# Patient Record
Sex: MALE | Race: BLACK OR AFRICAN AMERICAN | Employment: OTHER | URBAN - METROPOLITAN AREA
[De-identification: names, ages, dates, MRNs, and addresses within clinical notes are randomized per-mention and may not be internally consistent; named-entity substitution may affect disease eponyms.]

---

## 2017-01-04 ENCOUNTER — APPOINTMENT (OUTPATIENT)
Dept: INFUSION THERAPY | Age: 72
End: 2017-01-04
Payer: MEDICARE

## 2017-01-09 ENCOUNTER — APPOINTMENT (OUTPATIENT)
Dept: INFUSION THERAPY | Age: 72
End: 2017-01-09
Payer: MEDICARE

## 2017-01-10 ENCOUNTER — HOSPITAL ENCOUNTER (OUTPATIENT)
Dept: INFUSION THERAPY | Age: 72
Discharge: HOME | End: 2017-01-10
Payer: MEDICARE

## 2017-01-10 VITALS
HEART RATE: 68 BPM | RESPIRATION RATE: 18 BRPM | SYSTOLIC BLOOD PRESSURE: 164 MMHG | OXYGEN SATURATION: 98 % | TEMPERATURE: 97.4 F | DIASTOLIC BLOOD PRESSURE: 84 MMHG

## 2017-01-10 LAB
BASOPHILS # BLD AUTO: 0.1 K/UL (ref 0–0.06)
BASOPHILS # BLD: 1 % (ref 0–3)
DIFFERENTIAL METHOD BLD: ABNORMAL
EOSINOPHIL # BLD: 0.4 K/UL (ref 0–0.4)
EOSINOPHIL NFR BLD: 5 % (ref 0–5)
ERYTHROCYTE [DISTWIDTH] IN BLOOD BY AUTOMATED COUNT: 16.4 % (ref 11.6–14.5)
HCT VFR BLD AUTO: 23.4 % (ref 36–48)
HGB BLD-MCNC: 7.7 G/DL (ref 13–16)
LYMPHOCYTES # BLD AUTO: 18 % (ref 20–51)
LYMPHOCYTES # BLD: 1.3 K/UL (ref 0.8–3.5)
MCH RBC QN AUTO: 25 PG (ref 24–34)
MCHC RBC AUTO-ENTMCNC: 32.9 G/DL (ref 31–37)
MCV RBC AUTO: 76 FL (ref 74–97)
MONOCYTES # BLD: 1.1 K/UL (ref 0–1)
MONOCYTES NFR BLD AUTO: 15 % (ref 2–9)
NEUTS SEG # BLD: 4.5 K/UL (ref 1.8–8)
NEUTS SEG NFR BLD AUTO: 61 % (ref 42–75)
PLATELET # BLD AUTO: 183 K/UL (ref 135–420)
PLATELET COMMENTS,PCOM: ABNORMAL
RBC # BLD AUTO: 3.08 M/UL (ref 4.7–5.5)
RBC MORPH BLD: ABNORMAL
RBC MORPH BLD: ABNORMAL
WBC # BLD AUTO: 7.4 K/UL (ref 4.6–13.2)

## 2017-01-10 PROCEDURE — 96372 THER/PROPH/DIAG INJ SC/IM: CPT

## 2017-01-10 PROCEDURE — 36415 COLL VENOUS BLD VENIPUNCTURE: CPT

## 2017-01-10 PROCEDURE — 85025 COMPLETE CBC W/AUTO DIFF WBC: CPT | Performed by: INTERNAL MEDICINE

## 2017-01-10 PROCEDURE — 99211 OFF/OP EST MAY X REQ PHY/QHP: CPT

## 2017-01-10 PROCEDURE — 74011250636 HC RX REV CODE- 250/636: Performed by: INTERNAL MEDICINE

## 2017-01-10 RX ADMIN — ERYTHROPOIETIN 20000 UNITS: 20000 INJECTION, SOLUTION INTRAVENOUS; SUBCUTANEOUS at 15:55

## 2017-01-10 NOTE — PROGRESS NOTES
[facility] Progress Note    Date: January 10, 2017    Name: Jonathan Gonzalez    MRN: [id number]         : 1945      Mr. Kenia Young arrived to [facility] at [employer]. Mr. Kenia Young was assessed and education was provided. Mr. Karen Goode vitals were reviewed. Visit Vitals    /84 (BP 1 Location: Right arm, BP Patient Position: Sitting)    Pulse 68    Temp 97.4 °F (36.3 °C)    Resp 18    SpO2 98%       Blood drawn for labs via right antecubital venipuncture x1 attempt. Lab results were obtained and reviewed. HGB 7.7 and HCT 23.8. Notified Dr. Redd Messer regarding pt's hgb/ hct results, and pt c/o dyspnea w/ exertion & fatigue. Orders received to increase pt's Procrit dosage from 8000 units to 82702 units q2 weeks. D/w pt. Pt verbalized understanding. Procrit 02727 units was administered as ordered SQ in patient's right upper arm. Mr. Kenia Young tolerated well without complaints. Mr. Kenia Young was discharged from [place] [address] in stable condition at 1600. He is to return on 17 at  for his next appointment.     Yecenia Caraballo RN  January 10, 2017

## 2017-01-23 ENCOUNTER — APPOINTMENT (OUTPATIENT)
Dept: INFUSION THERAPY | Age: 72
End: 2017-01-23
Payer: MEDICARE

## 2017-01-24 ENCOUNTER — HOSPITAL ENCOUNTER (OUTPATIENT)
Dept: INFUSION THERAPY | Age: 72
Discharge: HOME | End: 2017-01-24
Payer: MEDICARE

## 2017-01-24 VITALS
TEMPERATURE: 97.3 F | RESPIRATION RATE: 18 BRPM | DIASTOLIC BLOOD PRESSURE: 84 MMHG | HEART RATE: 68 BPM | SYSTOLIC BLOOD PRESSURE: 166 MMHG | OXYGEN SATURATION: 96 %

## 2017-01-24 LAB
ALBUMIN SERPL BCP-MCNC: 2.8 G/DL (ref 3.4–5)
ANION GAP BLD CALC-SCNC: 13 MMOL/L (ref 3–18)
BASOPHILS # BLD AUTO: 0.1 K/UL (ref 0–0.06)
BASOPHILS # BLD: 2 % (ref 0–2)
BUN SERPL-MCNC: 86 MG/DL (ref 7–18)
BUN/CREAT SERPL: 15 (ref 12–20)
CALCIUM SERPL-MCNC: 5.2 MG/DL (ref 8.5–10.1)
CALCIUM SERPL-MCNC: 5.2 MG/DL (ref 8.5–10.1)
CHLORIDE SERPL-SCNC: 113 MMOL/L (ref 100–108)
CO2 SERPL-SCNC: 18 MMOL/L (ref 21–32)
CREAT SERPL-MCNC: 5.82 MG/DL (ref 0.6–1.3)
DIFFERENTIAL METHOD BLD: ABNORMAL
EOSINOPHIL # BLD: 0.4 K/UL (ref 0–0.4)
EOSINOPHIL NFR BLD: 7 % (ref 0–5)
ERYTHROCYTE [DISTWIDTH] IN BLOOD BY AUTOMATED COUNT: 16.9 % (ref 11.6–14.5)
GLUCOSE SERPL-MCNC: 123 MG/DL (ref 74–99)
HCT VFR BLD AUTO: 23.4 % (ref 36–48)
HGB BLD-MCNC: 7.6 G/DL (ref 13–16)
LYMPHOCYTES # BLD AUTO: 24 % (ref 21–52)
LYMPHOCYTES # BLD: 1.3 K/UL (ref 0.9–3.6)
MCH RBC QN AUTO: 25.1 PG (ref 24–34)
MCHC RBC AUTO-ENTMCNC: 32.5 G/DL (ref 31–37)
MCV RBC AUTO: 77.2 FL (ref 74–97)
MONOCYTES # BLD: 0.9 K/UL (ref 0.05–1.2)
MONOCYTES NFR BLD AUTO: 17 % (ref 3–10)
NEUTS SEG # BLD: 2.8 K/UL (ref 1.8–8)
NEUTS SEG NFR BLD AUTO: 50 % (ref 40–73)
PHOSPHATE SERPL-MCNC: 6.9 MG/DL (ref 2.5–4.9)
PLATELET # BLD AUTO: 228 K/UL (ref 135–420)
PLATELET COMMENTS,PCOM: ABNORMAL
POTASSIUM SERPL-SCNC: 4.8 MMOL/L (ref 3.5–5.5)
PTH-INTACT SERPL-MCNC: 40.4 PG/ML (ref 14–72)
RBC # BLD AUTO: 3.03 M/UL (ref 4.7–5.5)
RBC MORPH BLD: ABNORMAL
SODIUM SERPL-SCNC: 144 MMOL/L (ref 136–145)
WBC # BLD AUTO: 5.5 K/UL (ref 4.6–13.2)

## 2017-01-24 PROCEDURE — 74011250636 HC RX REV CODE- 250/636: Performed by: INTERNAL MEDICINE

## 2017-01-24 PROCEDURE — 96372 THER/PROPH/DIAG INJ SC/IM: CPT

## 2017-01-24 PROCEDURE — 80069 RENAL FUNCTION PANEL: CPT | Performed by: INTERNAL MEDICINE

## 2017-01-24 PROCEDURE — 85025 COMPLETE CBC W/AUTO DIFF WBC: CPT | Performed by: INTERNAL MEDICINE

## 2017-01-24 PROCEDURE — 83970 ASSAY OF PARATHORMONE: CPT | Performed by: INTERNAL MEDICINE

## 2017-01-24 PROCEDURE — 36415 COLL VENOUS BLD VENIPUNCTURE: CPT

## 2017-01-24 RX ADMIN — ERYTHROPOIETIN 20000 UNITS: 20000 INJECTION, SOLUTION INTRAVENOUS; SUBCUTANEOUS at 15:33

## 2017-01-24 NOTE — PROGRESS NOTES
[facility] Progress Note    Date: 2017    Name: Olesya Zuleta    MRN: [id number]         : 1945     Procrit/labs    Mr. Julissa Pena arrived to [facility] at [phone]. Mr. Julissa Pena was assessed and education was provided. Mr. Liane Zhou vitals were reviewed. Visit Vitals    /84 (BP 1 Location: Right arm, BP Patient Position: Sitting)    Pulse 68    Temp 97.3 °F (36.3 °C)    Resp 18    SpO2 96%       Blood drawn for labs via right antecubital venipuncture x1 attempt. Lab results were obtained and reviewed. HGB 7.7 and HCT 24.1    Spoke with 's nurse regarding pt's H&H being the same as last week despite the increase in the procrit dose and faxed over his lab results from today. No new orders at this time. Procrit 82466 units was administered as ordered SQ in patient's right upper arm. Mr. Julissa Pena tolerated well without complaints. Mr. Julissa Pena was discharged from [place] [address] in stable condition at [phone] 16. He is to return on 17 at 1500 for his next appointment.     Paola Koehler RN  2017

## 2017-02-06 ENCOUNTER — APPOINTMENT (OUTPATIENT)
Dept: INFUSION THERAPY | Age: 72
End: 2017-02-06
Payer: MEDICARE

## 2017-02-07 ENCOUNTER — HOSPITAL ENCOUNTER (OUTPATIENT)
Dept: INFUSION THERAPY | Age: 72
Discharge: HOME | End: 2017-02-07
Payer: MEDICARE

## 2017-02-07 VITALS
OXYGEN SATURATION: 97 % | SYSTOLIC BLOOD PRESSURE: 173 MMHG | TEMPERATURE: 98 F | DIASTOLIC BLOOD PRESSURE: 87 MMHG | HEART RATE: 73 BPM | RESPIRATION RATE: 18 BRPM

## 2017-02-07 LAB
BASOPHILS # BLD AUTO: 0.1 K/UL (ref 0–0.06)
BASOPHILS # BLD: 2 % (ref 0–2)
DIFFERENTIAL METHOD BLD: ABNORMAL
EOSINOPHIL # BLD: 0.6 K/UL (ref 0–0.4)
EOSINOPHIL NFR BLD: 10 % (ref 0–5)
ERYTHROCYTE [DISTWIDTH] IN BLOOD BY AUTOMATED COUNT: 16.2 % (ref 11.6–14.5)
HCT VFR BLD AUTO: 24.8 % (ref 36–48)
HGB BLD-MCNC: 8 G/DL (ref 13–16)
LYMPHOCYTES # BLD AUTO: 22 % (ref 21–52)
LYMPHOCYTES # BLD: 1.4 K/UL (ref 0.9–3.6)
MCH RBC QN AUTO: 25.4 PG (ref 24–34)
MCHC RBC AUTO-ENTMCNC: 32.3 G/DL (ref 31–37)
MCV RBC AUTO: 78.7 FL (ref 74–97)
MONOCYTES # BLD: 1.2 K/UL (ref 0.05–1.2)
MONOCYTES NFR BLD AUTO: 19 % (ref 3–10)
NEUTS SEG # BLD: 2.9 K/UL (ref 1.8–8)
NEUTS SEG NFR BLD AUTO: 47 % (ref 40–73)
PLATELET # BLD AUTO: 215 K/UL (ref 135–420)
PLATELET COMMENTS,PCOM: ABNORMAL
PMV BLD AUTO: 11.3 FL (ref 9.2–11.8)
RBC # BLD AUTO: 3.15 M/UL (ref 4.7–5.5)
RBC MORPH BLD: ABNORMAL
RBC MORPH BLD: ABNORMAL
WBC # BLD AUTO: 6.2 K/UL (ref 4.6–13.2)

## 2017-02-07 PROCEDURE — 96372 THER/PROPH/DIAG INJ SC/IM: CPT

## 2017-02-07 PROCEDURE — 85025 COMPLETE CBC W/AUTO DIFF WBC: CPT | Performed by: INTERNAL MEDICINE

## 2017-02-07 PROCEDURE — 36415 COLL VENOUS BLD VENIPUNCTURE: CPT

## 2017-02-07 PROCEDURE — 74011250636 HC RX REV CODE- 250/636: Performed by: INTERNAL MEDICINE

## 2017-02-07 RX ADMIN — ERYTHROPOIETIN 20000 UNITS: 20000 INJECTION, SOLUTION INTRAVENOUS; SUBCUTANEOUS at 15:12

## 2017-02-21 ENCOUNTER — HOSPITAL ENCOUNTER (OUTPATIENT)
Dept: INFUSION THERAPY | Age: 72
Discharge: HOME | End: 2017-02-21
Payer: MEDICARE

## 2017-02-21 VITALS
HEART RATE: 77 BPM | SYSTOLIC BLOOD PRESSURE: 170 MMHG | TEMPERATURE: 97.7 F | OXYGEN SATURATION: 97 % | RESPIRATION RATE: 18 BRPM | DIASTOLIC BLOOD PRESSURE: 79 MMHG

## 2017-02-21 LAB
BASOPHILS # BLD AUTO: 0.1 K/UL (ref 0–0.1)
BASOPHILS # BLD: 2 % (ref 0–2)
DIFFERENTIAL METHOD BLD: ABNORMAL
EOSINOPHIL # BLD: 0.6 K/UL (ref 0–0.4)
EOSINOPHIL NFR BLD: 8 % (ref 0–5)
ERYTHROCYTE [DISTWIDTH] IN BLOOD BY AUTOMATED COUNT: 15.2 % (ref 11.6–14.5)
HCT VFR BLD AUTO: 24.4 % (ref 36–48)
HGB BLD-MCNC: 9.3 G/DL (ref 13–16)
LYMPHOCYTES # BLD AUTO: 20 % (ref 21–52)
LYMPHOCYTES # BLD: 1.5 K/UL (ref 0.9–3.6)
MCH RBC QN AUTO: 32 PG (ref 24–34)
MCHC RBC AUTO-ENTMCNC: 38.1 G/DL (ref 31–37)
MCV RBC AUTO: 83.8 FL (ref 74–97)
MONOCYTES # BLD: 1.2 K/UL (ref 0.05–1.2)
MONOCYTES NFR BLD AUTO: 15 % (ref 3–10)
NEUTS SEG # BLD: 4.2 K/UL (ref 1.8–8)
NEUTS SEG NFR BLD AUTO: 55 % (ref 40–73)
PLATELET # BLD AUTO: 295 K/UL (ref 135–420)
PMV BLD AUTO: 11.3 FL (ref 9.2–11.8)
RBC # BLD AUTO: 2.91 M/UL (ref 4.7–5.5)
WBC # BLD AUTO: 8 K/UL (ref 4.6–13.2)

## 2017-02-21 PROCEDURE — 36415 COLL VENOUS BLD VENIPUNCTURE: CPT

## 2017-02-21 PROCEDURE — 85025 COMPLETE CBC W/AUTO DIFF WBC: CPT | Performed by: INTERNAL MEDICINE

## 2017-02-22 ENCOUNTER — OFFICE VISIT (OUTPATIENT)
Dept: CARDIOLOGY CLINIC | Age: 72
End: 2017-02-22

## 2017-02-22 VITALS
DIASTOLIC BLOOD PRESSURE: 74 MMHG | BODY MASS INDEX: 29.33 KG/M2 | WEIGHT: 198 LBS | HEIGHT: 69 IN | HEART RATE: 71 BPM | SYSTOLIC BLOOD PRESSURE: 138 MMHG

## 2017-02-22 DIAGNOSIS — I10 ESSENTIAL HYPERTENSION: ICD-10-CM

## 2017-02-22 DIAGNOSIS — N18.6 ESRD (END STAGE RENAL DISEASE) (HCC): ICD-10-CM

## 2017-02-22 DIAGNOSIS — R60.0 EDEMA EXTREMITIES: ICD-10-CM

## 2017-02-22 DIAGNOSIS — R06.02 SOB (SHORTNESS OF BREATH) ON EXERTION: Primary | ICD-10-CM

## 2017-02-22 RX ORDER — METOLAZONE 10 MG/1
10 TABLET ORAL DAILY
Qty: 30 TAB | Refills: 1 | Status: SHIPPED | OUTPATIENT
Start: 2017-02-22 | End: 2017-10-31

## 2017-02-22 NOTE — PROGRESS NOTES
HISTORY OF PRESENT ILLNESS  General Bhumika Dumont is a 73 y.o. male. New Patient   The history is provided by the patient. Associated symptoms include shortness of breath. Pertinent negatives include no chest pain, no abdominal pain and no headaches. Hypertension   The history is provided by the patient. This is a chronic problem. The problem occurs constantly. The problem has not changed since onset. Associated symptoms include shortness of breath. Pertinent negatives include no chest pain, no abdominal pain and no headaches. Nothing aggravates the symptoms. Cholesterol Problem   The history is provided by the patient. This is a chronic problem. The problem occurs constantly. Associated symptoms include shortness of breath. Pertinent negatives include no chest pain, no abdominal pain and no headaches. Shortness of Breath   The history is provided by the patient. This is a recurrent problem. The problem occurs frequently. The problem has been gradually worsening. Pertinent negatives include no fever, no headaches, no cough, no sputum production, no hemoptysis, no wheezing, no PND, no orthopnea, no chest pain, no vomiting, no abdominal pain, no rash, no leg swelling and no claudication. Precipitated by: exertion. Review of Systems   Constitutional: Negative for chills and fever. HENT: Negative for nosebleeds. Eyes: Negative for blurred vision and double vision. Respiratory: Positive for shortness of breath. Negative for cough, hemoptysis, sputum production and wheezing. Cardiovascular: Negative for chest pain, palpitations, orthopnea, claudication, leg swelling and PND. Gastrointestinal: Negative for abdominal pain, heartburn, nausea and vomiting. Musculoskeletal: Negative for myalgias. Skin: Negative for rash. Neurological: Negative for dizziness, weakness and headaches. Endo/Heme/Allergies: Does not bruise/bleed easily.      Family History   Problem Relation Age of Onset    Hypertension Sister     Hypertension Brother     Hypertension Father        Past Medical History:   Diagnosis Date    Anemia in chronic kidney disease     Chronic kidney disease     History of kidney transplant x3    History of echocardiogram 01/08/2015    EF 55-60%. No WMA. Mod conc LVH. RVSP 40  mmHg. Mod LAE.  JCARLOS. Mild MR. Mild PI. Mild AoRE. Mild ascend'g Ao dilatation.  HTN (hypertension)     Hypercholesterolemia     Sleep apnea     CPAP       Past Surgical History:   Procedure Laterality Date    HX PARTIAL THYROIDECTOMY      HX RENAL TRANSPLANT      HX SPLENECTOMY         Social History   Substance Use Topics    Smoking status: Former Smoker     Packs/day: 0.50     Years: 10.00     Quit date: 1/6/1975    Smokeless tobacco: Never Used    Alcohol use No       No Known Allergies    Prior to Admission medications    Medication Sig Start Date End Date Taking? Authorizing Provider   metOLazone (ZAROXOLYN) 10 mg tablet Take 1 Tab by mouth daily. 2/22/17  Yes Ole Ramos MD   calcitRIOL (ROCALTROL) 0.5 mcg capsule Take 1 Cap by mouth daily. 2/18/17  Yes Yessy Herrmann MD   amoxicillin-clavulanate (AUGMENTIN) 875-125 mg per tablet Take 1 Tab by mouth two (2) times a day. 11/25/16  Yes MARCOS Garcia   HYDROcodone-acetaminophen (NORCO) 5-325 mg per tablet Take 1-2 Tabs by mouth every four (4) hours as needed for Pain. Max Daily Amount: 12 Tabs. 11/21/16  Yes Corire Novak MD   furosemide (LASIX) 40 mg tablet Take 80 mg by mouth two (2) times a day. Indications: Edema   Yes Historical Provider   hydrALAZINE (APRESOLINE) 50 mg tablet Take 25 mg by mouth three (3) times daily. Yes Historical Provider   aspirin (ASPIRIN) 325 mg tablet Take 325 mg by mouth daily. Yes Historical Provider   pravastatin (PRAVACHOL) 40 mg tablet Take 40 mg by mouth nightly. Yes Historical Provider   Cetirizine (ZYRTEC) 10 mg cap Take  by mouth daily.    Yes Historical Provider   CHOLECALCIFEROL, VITAMIN D3, (VITAMIN D3 PO) Take  by mouth. Yes Historical Provider   multivitamin (ONE A DAY) tablet Take 1 tablet by mouth daily. Yes Historical Provider   carvedilol (COREG) 25 mg tablet Take 1 tablet by mouth two (2) times a day. 1/6/15  Yes Chey William MD   amLODIPine (NORVASC) 5 mg tablet Take 1 tablet by mouth daily. Patient taking differently: Take 10 mg by mouth daily. 1/6/15  Yes Chey William MD         Visit Vitals    /74    Pulse 71    Ht 5' 9\" (1.753 m)    Wt 89.8 kg (198 lb)    BMI 29.24 kg/m2         Physical Exam   Constitutional: He is oriented to person, place, and time. He appears well-developed and well-nourished. HENT:   Head: Normocephalic and atraumatic. Eyes: Conjunctivae are normal.   Neck: Neck supple. No JVD present. No tracheal deviation present. No thyromegaly present. Cardiovascular: Normal rate, regular rhythm and normal heart sounds. Exam reveals no gallop and no friction rub. No murmur heard. Pulmonary/Chest: No respiratory distress. He has no wheezes. He has rales. He exhibits no tenderness. Abdominal: Soft. There is no tenderness. Musculoskeletal: He exhibits no edema. Neurological: He is alert and oriented to person, place, and time. Skin: Skin is warm and dry. Psychiatric: He has a normal mood and affect. Mr. Roslyn Lopez has a reminder for a \"due or due soon\" health maintenance. I have asked that he contact his primary care provider for follow-up on this health maintenance. [id number]  Left ventricle: Systolic function was normal by EF (biplane method of  disks). Ejection fraction was estimated in the range of 55 % to 60 %. No  obvious wall motion abnormalities identified in the views obtained. Wall  thickness was moderately to markedly increased. There was moderate  concentric hypertrophy. Right ventricle: Systolic pressure was mildly to moderately increased. Estimated peak pressure was in the range of 40 mmHg.     Left atrium: The atrium was moderately dilated. Right atrium: The atrium was dilated. Mitral valve: There was mild regurgitation. Aortic valve: The valve was trileaflet. Pulmonic valve: There was mild regurgitation. Aorta, systemic arteries: The root exhibited mild dilatation. There was  mild dilatation of the ascending aorta.  ekg-2/2017  Sinus  Rhythm   WITHIN NORMAL LIMITS      Assessment         ICD-10-CM ICD-9-CM    1. SOB (shortness of breath) on exertion R06.02 786.05 2D ECHO COMPLETE ADULT (TTE)    fluid overload,ckd,chf   2. Essential hypertension I10 401.9 AMB POC EKG ROUTINE W/ 12 LEADS, INTER & REP    stable   3. ESRD (end stage renal disease) ([facility] N18.6 585.6     awaiting start of dialysis   4. Edema extremities R60.0 782.3     multifactorial   fluid overload mostly related to esrd-awaiting dialysis -will add metolazone to try increase diuresis      There are no discontinued medications. Orders Placed This Encounter    2D ECHO COMPLETE ADULT (TTE)     Standing Status:   Future     Standing Expiration Date:   8/21/2017     Order Specific Question:   Reason for Exam:     Answer:   see diagnosis    AMB POC EKG ROUTINE W/ 12 LEADS, INTER & REP     Order Specific Question:   Reason for Exam:     Answer:   htn    metOLazone (ZAROXOLYN) 10 mg tablet     Sig: Take 1 Tab by mouth daily. Dispense:  30 Tab     Refill:  1       Follow-up Disposition:  Return in about 4 weeks (around 3/22/2017).

## 2017-02-22 NOTE — LETTER
NOTIFICATION RETURN TO WORK / SCHOOL 
 
2/22/2017 9:57 AM 
 
Mr. COHEN [address] [place] [id number] To Whom It May Concern: 
 
NOEL is currently under the care of [address] [place] [address],[address]. His  Son Mr Osvaldo Alvarado provided  transportation for his visit. If there are questions or concerns please have the patient contact our office. Sincerely, MD Len Ace

## 2017-02-22 NOTE — MR AVS SNAPSHOT
Visit Information Date & Time Provider Department Dept. Phone Encounter #  
 2/22/2017  9:30 AM Mae Vance MD [facility] [id number] Follow-up Instructions Return in about 4 weeks (around 3/22/2017). Your Appointments 3/8/2017 12:45 PM  
PROCEDURE with CA ECHO [facility] [place] ([facility] Appt Note: kumar [address], [address] [facility] [address]  
[address], [address] [place] [address]  
  
    
 3/22/2017 10:00 AM  
ESTABLISHED PATIENT with Mae Vance MD  
[facility]) Appt Note: 4 weeks post Echo  
 [address], [address] [facility] [address]  
[address], [address] [place] [address] Upcoming Health Maintenance Date Due Hepatitis C Screening 1945 DTaP/Tdap/Td series (1 - Tdap) 7/7/1968 FOBT Q 1 YEAR AGE 50-75 7/7/1997 ZOSTER VACCINE AGE 60> 7/7/2007 GLAUCOMA SCREENING Q2Y 7/7/2012 Pneumococcal 65+ High/Highest Risk (1 of 2 - PCV13) 7/7/2012 MEDICARE YEARLY EXAM 7/7/2012 INFLUENZA AGE 9 TO ADULT 8/1/2016 Allergies as of 2/22/2017  Review Complete On: 2/22/2017 By: Mae Vance MD  
 No Known Allergies Current Immunizations  Reviewed on 2/21/2017 No immunizations on file. Not reviewed this visit You Were Diagnosed With   
  
 Codes Comments SOB (shortness of breath) on exertion    -  Primary ICD-10-CM: R06.02 
ICD-9-CM: 786.05 fluid overload,ckd,chf  
 Essential hypertension     ICD-10-CM: I10 
ICD-9-CM: 401.9 stable ESRD (end stage renal disease) ([facility]     ICD-10-CM: N18.6 ICD-9-CM: 585.6 awaiting start of dialysis Edema extremities     ICD-10-CM: R60.0 ICD-9-CM: 782.3 multifactorial  
  
Vitals BP  
  
  
  
  
  
 138/74 Vitals History BMI and BSA Data Body Mass Index Body Surface Area 29.24 kg/m 2 2.09 m 2 Preferred Pharmacy Pharmacy Name Phone [facility] [address] [place], [place] [address] [address] Your Updated Medication List  
  
   
This list is accurate as of: 2/22/17  9:57 AM.  Always use your most recent med list. amLODIPine 5 mg tablet Commonly known as:  Mikecosta Morris Take 1 tablet by mouth daily. amoxicillin-clavulanate 875-125 mg per tablet Commonly known as:  AUGMENTIN Take 1 Tab by mouth two (2) times a day. aspirin 325 mg tablet Commonly known as:  ASPIRIN Take 325 mg by mouth daily. calcitRIOL 0.5 mcg capsule Commonly known as:  ROCALTROL Take 1 Cap by mouth daily. carvedilol 25 mg tablet Commonly known as:  Vergia Moris Take 1 tablet by mouth two (2) times a day. hydrALAZINE 50 mg tablet Commonly known as:  APRESOLINE Take 25 mg by mouth three (3) times daily. HYDROcodone-acetaminophen 5-325 mg per tablet Commonly known as:  Alexisuri Lamas Take 1-2 Tabs by mouth every four (4) hours as needed for Pain. Max Daily Amount: 12 Tabs. LASIX 40 mg tablet Generic drug:  furosemide Take 80 mg by mouth two (2) times a day. Indications: Edema  
  
 metOLazone 10 mg tablet Commonly known as:  Maksim Bowling Take 1 Tab by mouth daily. multivitamin tablet Commonly known as:  ONE A DAY Take 1 tablet by mouth daily. PRAVACHOL 40 mg tablet Generic drug:  pravastatin Take 40 mg by mouth nightly. VITAMIN D3 PO Take  by mouth. ZyrTEC 10 mg Cap Generic drug:  Cetirizine Take  by mouth daily. Prescriptions Sent to Pharmacy Refills  
 metOLazone (ZAROXOLYN) 10 mg tablet 1 Sig: Take 1 Tab by mouth daily. Class: Normal  
 Pharmacy: [address] [place] [address], [facility] [address] [facility] #: [phone] Route: Oral  
  
We Performed the Following AMB POC EKG ROUTINE W/ 12 LEADS, INTER & REP [94570 CPT(R)] Follow-up Instructions Return in about 4 weeks (around 3/22/2017). To-Do List   
 02/25/2017 Cardiac Services:  2D ECHO COMPLETE ADULT (TTE) Please provide this summary of care documentation to your next provider. Your primary care clinician is listed as ERINN DA SILVA. If you have any questions after today's visit, please call [phone].

## 2017-02-22 NOTE — LETTER
[facility] 1945 2/22/2017 Dear July Marshall MD 
 
I had the pleasure of evaluating  Mr. Trell Duffy in office today. Below are the relevant portions of my assessment and plan of care. ICD-10-CM ICD-9-CM 1. SOB (shortness of breath) on exertion R06.02 786.05 2D ECHO COMPLETE ADULT (TTE) fluid overload,ckd,chf  
2. Essential hypertension I10 401.9 AMB POC EKG ROUTINE W/ 12 LEADS, INTER & REP  
 stable 3. ESRD (end stage renal disease) ([facility] N18.6 585.6   
 awaiting start of dialysis 4. Edema extremities R60.0 782.3   
 multifactorial  
 
 
Current Outpatient Prescriptions Medication Sig Dispense Refill  metOLazone (ZAROXOLYN) 10 mg tablet Take 1 Tab by mouth daily. 30 Tab 1  calcitRIOL (ROCALTROL) 0.5 mcg capsule Take 1 Cap by mouth daily. 30 Cap 0  
 amoxicillin-clavulanate (AUGMENTIN) 875-125 mg per tablet Take 1 Tab by mouth two (2) times a day. 14 Tab 0  
 HYDROcodone-acetaminophen (NORCO) 5-325 mg per tablet Take 1-2 Tabs by mouth every four (4) hours as needed for Pain. Max Daily Amount: 12 Tabs. 40 Tab 0  
 furosemide (LASIX) 40 mg tablet Take 80 mg by mouth two (2) times a day. Indications: Edema  hydrALAZINE (APRESOLINE) 50 mg tablet Take 25 mg by mouth three (3) times daily.  aspirin (ASPIRIN) 325 mg tablet Take 325 mg by mouth daily.  pravastatin (PRAVACHOL) 40 mg tablet Take 40 mg by mouth nightly.  Cetirizine (ZYRTEC) 10 mg cap Take  by mouth daily.  CHOLECALCIFEROL, VITAMIN D3, (VITAMIN D3 PO) Take  by mouth.  multivitamin (ONE A DAY) tablet Take 1 tablet by mouth daily.  carvedilol (COREG) 25 mg tablet Take 1 tablet by mouth two (2) times a day. 60 tablet 5  
 amLODIPine (NORVASC) 5 mg tablet Take 1 tablet by mouth daily. (Patient taking differently: Take 10 mg by mouth daily.) 30 tablet 5 Orders Placed This Encounter  2D ECHO COMPLETE ADULT (TTE) Standing Status:   Future Standing Expiration Date:   8/21/2017 Order Specific Question:   Reason for Exam: Answer:   see diagnosis  AMB POC EKG ROUTINE W/ 12 LEADS, INTER & REP Order Specific Question:   Reason for Exam: Answer:   htn  metOLazone (ZAROXOLYN) 10 mg tablet Sig: Take 1 Tab by mouth daily. Dispense:  30 Tab Refill:  1 If you have questions, please do not hesitate to call me. I look forward to following Mr. Kenia Young along with you. Sincerely, Ana Luisa Jean MD

## 2017-02-24 ENCOUNTER — TELEPHONE (OUTPATIENT)
Dept: CARDIOLOGY CLINIC | Age: 72
End: 2017-02-24

## 2017-02-24 NOTE — TELEPHONE ENCOUNTER
Patient called and states that he is starting Dialysis on 2/25/17 does he continue Metolazone once he starts dialysis, please advise

## 2017-03-07 ENCOUNTER — APPOINTMENT (OUTPATIENT)
Dept: INFUSION THERAPY | Age: 72
End: 2017-03-07

## 2017-03-08 ENCOUNTER — CLINICAL SUPPORT (OUTPATIENT)
Dept: CARDIOLOGY CLINIC | Age: 72
End: 2017-03-08

## 2017-03-08 DIAGNOSIS — R06.02 SOB (SHORTNESS OF BREATH) ON EXERTION: ICD-10-CM

## 2017-03-21 ENCOUNTER — APPOINTMENT (OUTPATIENT)
Dept: INFUSION THERAPY | Age: 72
End: 2017-03-21

## 2017-03-22 ENCOUNTER — OFFICE VISIT (OUTPATIENT)
Dept: CARDIOLOGY CLINIC | Age: 72
End: 2017-03-22

## 2017-03-22 VITALS
BODY MASS INDEX: 26.07 KG/M2 | HEIGHT: 69 IN | SYSTOLIC BLOOD PRESSURE: 154 MMHG | DIASTOLIC BLOOD PRESSURE: 82 MMHG | HEART RATE: 63 BPM | WEIGHT: 176 LBS

## 2017-03-22 DIAGNOSIS — I10 ESSENTIAL HYPERTENSION: ICD-10-CM

## 2017-03-22 DIAGNOSIS — R60.0 EDEMA EXTREMITIES: Primary | ICD-10-CM

## 2017-03-22 DIAGNOSIS — N18.6 ESRD (END STAGE RENAL DISEASE) (HCC): ICD-10-CM

## 2017-03-22 RX ORDER — LANTHANUM CARBONATE 1000 MG/1
1000 TABLET, CHEWABLE ORAL
COMMUNITY
End: 2017-10-31

## 2017-03-22 NOTE — LETTER
General [address] 1945 
 
3/22/2017 Dear Jimi Cox MD 
 
I had the pleasure of evaluating  Mr. [address] in office today. Below are the relevant portions of my assessment and plan of care. ICD-10-CM ICD-9-CM 1. Edema extremities R60.0 782.3   
 improved 
normal lvef 
likely from ckd 
better on dialysis now 2. ESRD (end stage renal disease) ([facility] N18.6 585.6   
 now on dialysis 3. Essential hypertension I10 401.9   
 milely elevated 
has low bp on dialysis Current Outpatient Prescriptions Medication Sig Dispense Refill  lanthanum (FOSRENOL) 1,000 mg chewable tablet Take 1,000 mg by mouth.  metOLazone (ZAROXOLYN) 10 mg tablet Take 1 Tab by mouth daily. 30 Tab 1  calcitRIOL (ROCALTROL) 0.5 mcg capsule Take 1 Cap by mouth daily. 30 Cap 0  
 amoxicillin-clavulanate (AUGMENTIN) 875-125 mg per tablet Take 1 Tab by mouth two (2) times a day. 14 Tab 0  
 HYDROcodone-acetaminophen (NORCO) 5-325 mg per tablet Take 1-2 Tabs by mouth every four (4) hours as needed for Pain. Max Daily Amount: 12 Tabs. 40 Tab 0  
 hydrALAZINE (APRESOLINE) 50 mg tablet Take 25 mg by mouth three (3) times daily.  aspirin (ASPIRIN) 325 mg tablet Take 325 mg by mouth daily.  pravastatin (PRAVACHOL) 40 mg tablet Take 40 mg by mouth nightly.  Cetirizine (ZYRTEC) 10 mg cap Take  by mouth daily.  CHOLECALCIFEROL, VITAMIN D3, (VITAMIN D3 PO) Take  by mouth.  multivitamin (ONE A DAY) tablet Take 1 tablet by mouth daily.  carvedilol (COREG) 25 mg tablet Take 1 tablet by mouth two (2) times a day. 60 tablet 5  
 amLODIPine (NORVASC) 5 mg tablet Take 1 tablet by mouth daily. (Patient taking differently: Take 10 mg by mouth daily.) 30 tablet 5 Orders Placed This Encounter  lanthanum (FOSRENOL) 1,000 mg chewable tablet Sig: Take 1,000 mg by mouth. If you have questions, please do not hesitate to call me.   I look forward to following Mr. [address] [facility] along with you. Sincerely, Stoney Hamman, MD

## 2017-03-22 NOTE — PROGRESS NOTES
HISTORY OF PRESENT ILLNESS  General 1981 Latrell Young is a 73 y.o. male. Hypertension   The history is provided by the patient. This is a chronic problem. The problem occurs constantly. The problem has not changed since onset. Pertinent negatives include no chest pain and no shortness of breath. Nothing aggravates the symptoms. Cholesterol Problem   The history is provided by the patient. This is a chronic problem. The problem occurs constantly. Pertinent negatives include no chest pain and no shortness of breath. Shortness of Breath   The history is provided by the patient. This is a recurrent problem. The problem occurs frequently. The problem has been rapidly improving. Pertinent negatives include no fever, no cough, no sputum production, no hemoptysis, no wheezing, no PND, no orthopnea, no chest pain, no vomiting, no rash, no leg swelling and no claudication. Precipitated by: exertion. Dizziness   The history is provided by the patient. This is a recurrent problem. The problem occurs rarely. Pertinent negatives include no chest pain and no shortness of breath. Review of Systems   Constitutional: Negative for chills and fever. HENT: Negative for nosebleeds. Eyes: Negative for blurred vision and double vision. Respiratory: Negative for cough, hemoptysis, sputum production, shortness of breath and wheezing. Cardiovascular: Negative for chest pain, palpitations, orthopnea, claudication, leg swelling and PND. Gastrointestinal: Negative for heartburn, nausea and vomiting. Musculoskeletal: Negative for myalgias. Skin: Negative for rash. Neurological: Positive for dizziness. Negative for weakness. Endo/Heme/Allergies: Does not bruise/bleed easily.      Family History   Problem Relation Age of Onset    Hypertension Sister     Hypertension Brother     Hypertension Father        Past Medical History:   Diagnosis Date    Anemia in chronic kidney disease     Chronic kidney disease     History of kidney transplant x3    History of echocardiogram 01/08/2015    EF 55-60%. No WMA. Mod conc LVH. RVSP 40  mmHg. Mod LAE.  JCARLOS. Mild MR. Mild PI. Mild AoRE. Mild ascend'g Ao dilatation.  HTN (hypertension)     Hypercholesterolemia     Sleep apnea     CPAP       Past Surgical History:   Procedure Laterality Date    HX PARTIAL THYROIDECTOMY      HX RENAL TRANSPLANT      HX SPLENECTOMY         Social History   Substance Use Topics    Smoking status: Former Smoker     Packs/day: 0.50     Years: 10.00     Quit date: 1/6/1975    Smokeless tobacco: Never Used    Alcohol use No       No Known Allergies    Prior to Admission medications    Medication Sig Start Date End Date Taking? Authorizing Provider   lanthanum (FOSRENOL) 1,000 mg chewable tablet Take 1,000 mg by mouth. Yes Historical Provider   metOLazone (ZAROXOLYN) 10 mg tablet Take 1 Tab by mouth daily. 2/22/17  Yes Torres Dietrich MD   calcitRIOL (ROCALTROL) 0.5 mcg capsule Take 1 Cap by mouth daily. 2/18/17  Yes Patrick Negro MD   amoxicillin-clavulanate (AUGMENTIN) 875-125 mg per tablet Take 1 Tab by mouth two (2) times a day. 11/25/16  Yes MARCOS Arellano   HYDROcodone-acetaminophen (NORCO) 5-325 mg per tablet Take 1-2 Tabs by mouth every four (4) hours as needed for Pain. Max Daily Amount: 12 Tabs. 11/21/16  Yes Annette York MD   hydrALAZINE (APRESOLINE) 50 mg tablet Take 25 mg by mouth three (3) times daily. Yes Historical Provider   aspirin (ASPIRIN) 325 mg tablet Take 325 mg by mouth daily. Yes Historical Provider   pravastatin (PRAVACHOL) 40 mg tablet Take 40 mg by mouth nightly. Yes Historical Provider   Cetirizine (ZYRTEC) 10 mg cap Take  by mouth daily. Yes Historical Provider   CHOLECALCIFEROL, VITAMIN D3, (VITAMIN D3 PO) Take  by mouth. Yes Historical Provider   multivitamin (ONE A DAY) tablet Take 1 tablet by mouth daily.    Yes Historical Provider   carvedilol (COREG) 25 mg tablet Take 1 tablet by mouth two (2) times a day. 1/6/15  Yes Marshal Carrillo MD   amLODIPine (NORVASC) 5 mg tablet Take 1 tablet by mouth daily. Patient taking differently: Take 10 mg by mouth daily. 1/6/15  Yes Marshal Carrillo MD         Visit Vitals    /82    Pulse 63    Ht 5' 9\" (1.753 m)    Wt 79.8 kg (176 lb)    BMI 25.99 kg/m2         Physical Exam   Constitutional: He is oriented to person, place, and time. He appears well-developed and well-nourished. HENT:   Head: Normocephalic and atraumatic. Eyes: Conjunctivae are normal.   Neck: Neck supple. No JVD present. No tracheal deviation present. No thyromegaly present. Cardiovascular: Normal rate, regular rhythm and normal heart sounds. Exam reveals no gallop and no friction rub. No murmur heard. Pulmonary/Chest: No respiratory distress. He has no wheezes. He has rales. He exhibits no tenderness. Abdominal: Soft. There is no tenderness. Musculoskeletal: He exhibits no edema. Neurological: He is alert and oriented to person, place, and time. Skin: Skin is warm and dry. Psychiatric: He has a normal mood and affect. Mr. Trell Duffy has a reminder for a \"due or due soon\" health maintenance. I have asked that he contact his primary care provider for follow-up on this health maintenance. MFIWIN  Left ventricle: Systolic function was normal by EF (biplane method of  disks). Ejection fraction was estimated in the range of 55 % to 60 %. No  obvious wall motion abnormalities identified in the views obtained. Wall  thickness was moderately to markedly increased. There was moderate  concentric hypertrophy. Right ventricle: Systolic pressure was mildly to moderately increased. Estimated peak pressure was in the range of 40 mmHg. Left atrium: The atrium was moderately dilated. Right atrium: The atrium was dilated. Mitral valve: There was mild regurgitation. Aortic valve: The valve was trileaflet. Pulmonic valve: There was mild regurgitation.     Aorta, systemic arteries: The root exhibited mild dilatation. There was  mild dilatation of the ascending aorta.  ekg-2/2017  Sinus  Rhythm   WITHIN NORMAL LIMITS    SUMMARY:3/2017-echo  Left ventricle: Systolic function was normal. Ejection fraction was  estimated in the range of 60 % to 65 %. There were no regional wall motion  abnormalities. There was moderate concentric hypertrophy. Features were  consistent with a pseudonormal left ventricular filling pattern, with  concomitant abnormal relaxation and increased filling pressure (grade 2  diastolic dysfunction). Right ventricle: The ventricle was dilated. Left atrium: The atrium was severely dilated. LA volume index was 73.44  ml/mï¾². Right atrium: The atrium was dilated. Mitral valve: There was mild annular calcification. There was mild  regurgitation. Tricuspid valve: There was mild regurgitation. Pulmonic valve: There was mild regurgitation. Aorta, systemic arteries: The root exhibited upper limit of normal size. Assessment         ICD-10-CM ICD-9-CM    1. Edema extremities R60.0 782.3     improved  normal lvef  likely from ckd  better on dialysis now   2. [facility] N18.6 585.6     now on dialysis   3. Essential hypertension I10 401.9     milely elevated  has low bp on dialysis   fluid overload mostly related to esrd-improved with dialysis      Medications Discontinued During This Encounter   Medication Reason    furosemide (LASIX) 40 mg tablet Not A Current Medication       No orders of the defined types were placed in this encounter. Follow-up Disposition:  Return in about 6 months (around 9/22/2017).

## 2017-03-22 NOTE — PROGRESS NOTES
1. Have you been to the ER, urgent care clinic since your last visit? Hospitalized since your last visit? No    2. Have you seen or consulted any other health care providers outside of the [address] [place] [address] since your last visit? Include any pap smears or colon screening. No     3. Since your last visit, have you had any of the following symptoms? SOB      4. Have you had any blood work, X-rays or cardiac testing? None     5. Where do you normally have your labs drawn? [facility]       6. Do you need any refills today?   No

## 2017-03-22 NOTE — MR AVS SNAPSHOT
Visit Information Date & Time Provider Department Dept. Phone Encounter #  
 3/22/2017 10:00 AM Brenda Jordan MD [facility] [address] [place] [address] [id number] Follow-up Instructions Return in about 6 months (around 9/22/2017). Upcoming Health Maintenance Date Due Hepatitis C Screening 1945 DTaP/Tdap/Td series (1 - Tdap) 7/7/1968 FOBT Q 1 YEAR AGE 50-75 7/7/1997 ZOSTER VACCINE AGE 60> 7/7/2007 GLAUCOMA SCREENING Q2Y 7/7/2012 Pneumococcal 65+ High/Highest Risk (1 of 2 - PCV13) 7/7/2012 MEDICARE YEARLY EXAM 7/7/2012 INFLUENZA AGE 9 TO ADULT 8/1/2016 Allergies as of 3/22/2017  Review Complete On: 3/22/2017 By: Brenda Jordan MD  
 No Known Allergies Current Immunizations  Reviewed on 2/21/2017 No immunizations on file. Not reviewed this visit You Were Diagnosed With   
  
 Codes Comments Edema extremities    -  Primary ICD-10-CM: R60.0 ICD-9-CM: 782.3 improved 
normal lvef 
likely from ckd 
better on dialysis now ESRD (end stage renal disease) ([facility]     ICD-10-CM: N18.6 ICD-9-CM: 585.6 now on dialysis Essential hypertension     ICD-10-CM: I10 
ICD-9-CM: 401.9 milely elevated 
has low bp on dialysis Vitals BP Pulse Height(growth percentile) Weight(growth percentile) BMI Smoking Status 154/82 63 5' 9\" (1.753 m) 176 lb (79.8 kg) 25.99 kg/m2 Former Smoker Vitals History BMI and BSA Data Body Mass Index Body Surface Area  
 25.99 kg/m 2 1.97 m 2 Preferred Pharmacy Pharm[facility] Name Phone [facility] [address] [facility] [address] Your Updated Medication List  
  
   
This list is accurate as of: 3/22/17 11:02 AM.  Always use your most recent med list. amLODIPine 5 mg tablet Commonly known as:  Lennis Eagles Take 1 tablet by mouth daily. amoxicillin-clavulanate 875-125 mg per tablet Commonly known as:  AUGMENTIN  
 Take 1 Tab by mouth two (2) times a day. aspirin 325 mg tablet Commonly known as:  ASPIRIN Take 325 mg by mouth daily. calcitRIOL 0.5 mcg capsule Commonly known as:  ROCALTROL Take 1 Cap by mouth daily. carvedilol 25 mg tablet Commonly known as:  Chavira Servant Take 1 tablet by mouth two (2) times a day. FOSRENOL 1,000 mg chewable tablet Generic drug:  lanthanum Take 1,000 mg by mouth. hydrALAZINE 50 mg tablet Commonly known as:  APRESOLINE Take 25 mg by mouth three (3) times daily. HYDROcodone-acetaminophen 5-325 mg per tablet Commonly known as:  Cris President Take 1-2 Tabs by mouth every four (4) hours as needed for Pain. Max Daily Amount: 12 Tabs.  
  
 metOLazone 10 mg tablet Commonly known as:  Amena Pore Take 1 Tab by mouth daily. multivitamin tablet Commonly known as:  ONE A DAY Take 1 tablet by mouth daily. PRAVACHOL 40 mg tablet Generic drug:  pravastatin Take 40 mg by mouth nightly. VITAMIN D3 PO Take  by mouth. ZyrTEC 10 mg Cap Generic drug:  Cetirizine Take  by mouth daily. Follow-up Instructions Return in about 6 months (around 9/22/2017). Please provide this summary of care documentation to your next provider. Your primary care clinician is listed as ERINN DA SILVA. If you have any questions after today's visit, please call [phone].

## 2017-09-21 ENCOUNTER — HOSPITAL ENCOUNTER (OUTPATIENT)
Dept: LAB | Age: 72
Discharge: HOME | End: 2017-09-21
Payer: MEDICARE

## 2017-09-21 ENCOUNTER — OFFICE VISIT (OUTPATIENT)
Dept: CARDIOLOGY CLINIC | Age: 72
End: 2017-09-21

## 2017-09-21 VITALS
DIASTOLIC BLOOD PRESSURE: 76 MMHG | WEIGHT: 171 LBS | HEART RATE: 65 BPM | HEIGHT: 69 IN | SYSTOLIC BLOOD PRESSURE: 128 MMHG | BODY MASS INDEX: 25.33 KG/M2

## 2017-09-21 DIAGNOSIS — R60.0 EDEMA EXTREMITIES: ICD-10-CM

## 2017-09-21 DIAGNOSIS — I10 ESSENTIAL HYPERTENSION: ICD-10-CM

## 2017-09-21 DIAGNOSIS — Z01.810 PRE-OPERATIVE CARDIOVASCULAR EXAMINATION: Primary | ICD-10-CM

## 2017-09-21 DIAGNOSIS — N18.6 ESRD (END STAGE RENAL DISEASE) (HCC): ICD-10-CM

## 2017-09-21 DIAGNOSIS — E78.00 HYPERCHOLESTEROLEMIA: ICD-10-CM

## 2017-09-21 DIAGNOSIS — Z01.810 PRE-OPERATIVE CARDIOVASCULAR EXAMINATION: ICD-10-CM

## 2017-09-21 LAB
ANION GAP SERPL CALC-SCNC: 7 MMOL/L (ref 3–18)
BASOPHILS # BLD: 0.1 K/UL (ref 0–0.06)
BASOPHILS NFR BLD: 1 % (ref 0–3)
BUN SERPL-MCNC: 32 MG/DL (ref 7–18)
BUN/CREAT SERPL: 4 (ref 12–20)
CALCIUM SERPL-MCNC: 8.4 MG/DL (ref 8.5–10.1)
CHLORIDE SERPL-SCNC: 98 MMOL/L (ref 100–108)
CO2 SERPL-SCNC: 33 MMOL/L (ref 21–32)
CREAT SERPL-MCNC: 8.52 MG/DL (ref 0.6–1.3)
DIFFERENTIAL METHOD BLD: ABNORMAL
EOSINOPHIL # BLD: 0.4 K/UL (ref 0–0.4)
EOSINOPHIL NFR BLD: 5 % (ref 0–5)
ERYTHROCYTE [DISTWIDTH] IN BLOOD BY AUTOMATED COUNT: 14.7 % (ref 11.6–14.5)
GLUCOSE SERPL-MCNC: 125 MG/DL (ref 74–99)
HCT VFR BLD AUTO: 37 % (ref 36–48)
HGB BLD-MCNC: 11.6 G/DL (ref 13–16)
INR PPP: 1 (ref 0.8–1.2)
LYMPHOCYTES # BLD: 3.1 K/UL (ref 0.8–3.5)
LYMPHOCYTES NFR BLD: 37 % (ref 20–51)
MCH RBC QN AUTO: 27.5 PG (ref 24–34)
MCHC RBC AUTO-ENTMCNC: 31.4 G/DL (ref 31–37)
MCV RBC AUTO: 87.7 FL (ref 74–97)
MONOCYTES # BLD: 1.7 K/UL (ref 0–1)
MONOCYTES NFR BLD: 20 % (ref 2–9)
NEUTS SEG # BLD: 3.2 K/UL (ref 1.8–8)
NEUTS SEG NFR BLD: 37 % (ref 42–75)
NRBC BLD-RTO: 2 PER 100 WBC
PLATELET # BLD AUTO: 237 K/UL (ref 135–420)
PLATELET COMMENTS,PCOM: ABNORMAL
PMV BLD AUTO: 11.1 FL (ref 9.2–11.8)
POTASSIUM SERPL-SCNC: 4.3 MMOL/L (ref 3.5–5.5)
PROTHROMBIN TIME: 13.1 SEC (ref 11.5–15.2)
RBC # BLD AUTO: 4.22 M/UL (ref 4.7–5.5)
RBC MORPH BLD: ABNORMAL
SODIUM SERPL-SCNC: 138 MMOL/L (ref 136–145)
WBC # BLD AUTO: 8.5 K/UL (ref 4.6–13.2)

## 2017-09-21 PROCEDURE — 85610 PROTHROMBIN TIME: CPT | Performed by: INTERNAL MEDICINE

## 2017-09-21 PROCEDURE — 80048 BASIC METABOLIC PNL TOTAL CA: CPT | Performed by: INTERNAL MEDICINE

## 2017-09-21 PROCEDURE — 85025 COMPLETE CBC W/AUTO DIFF WBC: CPT | Performed by: INTERNAL MEDICINE

## 2017-09-21 PROCEDURE — 36415 COLL VENOUS BLD VENIPUNCTURE: CPT | Performed by: INTERNAL MEDICINE

## 2017-09-21 RX ORDER — ASPIRIN 81 MG/1
81 TABLET ORAL DAILY
COMMUNITY

## 2017-09-21 NOTE — LETTER
[facility] [place] 1945 9/21/2017 Dear MD Eulalio Martell MD 
 
I had the pleasure of evaluating  Mr. Trell Duffy in office today. Below are the relevant portions of my assessment and plan of care. ICD-10-CM ICD-9-CM 1. Pre-operative cardiovascular examination Z01.810 V72.81 CARDIAC CATHETERIZATION  
   METABOLIC PANEL, BASIC  
   CBC WITH AUTOMATED DIFF PROTHROMBIN TIME + INR  
 for renal transplant 
needs cardiac cath 2. Essential hypertension I10 401.9   
 controlled 3. ESRD (end stage renal disease) ([facility]) N18.6 585.6 CARDIAC CATHETERIZATION  
   METABOLIC PANEL, BASIC  
   CBC WITH AUTOMATED DIFF PROTHROMBIN TIME + INR  
 on dialysis now 4. Hypercholesterolemia E78.00 272.0   
 stable 5. Edema extremities R60.0 782.3   
 improved on dialysis Current Outpatient Prescriptions Medication Sig Dispense Refill  aspirin delayed-release 81 mg tablet Take 81 mg by mouth daily.  lanthanum (FOSRENOL) 1,000 mg chewable tablet Take 1,000 mg by mouth.  calcitRIOL (ROCALTROL) 0.5 mcg capsule Take 1 Cap by mouth daily. 30 Cap 0  
 pravastatin (PRAVACHOL) 40 mg tablet Take 40 mg by mouth nightly.  Cetirizine (ZYRTEC) 10 mg cap Take  by mouth daily.  CHOLECALCIFEROL, VITAMIN D3, (VITAMIN D3 PO) Take  by mouth.  multivitamin (ONE A DAY) tablet Take 1 tablet by mouth daily.  carvedilol (COREG) 25 mg tablet Take 1 tablet by mouth two (2) times a day. 60 tablet 5  
 metOLazone (ZAROXOLYN) 10 mg tablet Take 1 Tab by mouth daily. 30 Tab 1  
 amoxicillin-clavulanate (AUGMENTIN) 875-125 mg per tablet Take 1 Tab by mouth two (2) times a day. 14 Tab 0  
 HYDROcodone-acetaminophen (NORCO) 5-325 mg per tablet Take 1-2 Tabs by mouth every four (4) hours as needed for Pain. Max Daily Amount: 12 Tabs. 40 Tab 0  
 hydrALAZINE (APRESOLINE) 50 mg tablet Take 25 mg by mouth three (3) times daily.  amLODIPine (NORVASC) 5 mg tablet Take 1 tablet by mouth daily. (Patient taking differently: Take 10 mg by mouth daily.) 30 tablet 5 Orders Placed This Encounter  METABOLIC PANEL, BASIC Standing Status:   Future Standing Expiration Date:   10/21/2017  CBC WITH AUTOMATED DIFF Standing Status:   Future Standing Expiration Date:   10/21/2017  
 PROTHROMBIN TIME + INR Standing Status:   Future Standing Expiration Date:   10/21/2017  CARDIAC CATHETERIZATION Standing Status:   Future Standing Expiration Date:   3/24/2018 Order Specific Question:   Reason for Exam: Answer:   see diagnosis  aspirin delayed-release 81 mg tablet Sig: Take 81 mg by mouth daily. If you have questions, please do not hesitate to call me. I look forward to following Mr. Trell Duffy along with you. Sincerely, Juwan Macdonald MD

## 2017-09-21 NOTE — PROGRESS NOTES
1. Have you been to the ER, urgent care clinic since your last visit? Hospitalized since your last visit? No    2. Have you seen or consulted any other health care providers outside of the [address] [place] [address] since your last visit? Include any pap smears or colon screening.  Yes PCP and Kidney

## 2017-09-21 NOTE — MR AVS SNAPSHOT
Visit Information Date & Time Provider Department Dept. Phone Encounter #  
 9/21/2017 10:45 AM Alison Osborne MD [facility] [address] [facility] [id number] Follow-up Instructions Return in about 6 weeks (around 11/2/2017). Upcoming Health Maintenance Date Due Hepatitis C Screening 1945 DTaP/Tdap/Td series (1 - Tdap) 7/7/1968 FOBT Q 1 YEAR AGE 50-75 7/7/1997 ZOSTER VACCINE AGE 60> 5/7/2007 GLAUCOMA SCREENING Q2Y 7/7/2012 Pneumococcal 65+ High/Highest Risk (1 of 2 - PCV13) 7/7/2012 MEDICARE YEARLY EXAM 7/7/2012 INFLUENZA AGE 9 TO ADULT 8/1/2017 Allergies as of 9/21/2017  Review Complete On: 9/21/2017 By: Alison Osborne MD  
 No Known Allergies Current Immunizations  Reviewed on 2/21/2017 No immunizations on file. Not reviewed this visit You Were Diagnosed With   
  
 Codes Comments Pre-operative cardiovascular examination    -  Primary ICD-10-CM: Z01.810 ICD-9-CM: V72.81 for renal transplant 
needs cardiac cath Essential hypertension     ICD-10-CM: I10 
ICD-9-CM: 401.9 controlled ESRD (end stage renal disease) ([facility]     ICD-10-CM: N18.6 ICD-9-CM: 585.6 on dialysis now Hypercholesterolemia     ICD-10-CM: E78.00 ICD-9-CM: 272.0 stable Edema extremities     ICD-10-CM: R60.0 ICD-9-CM: 782.3 improved on dialysis Vitals BP Pulse Height(growth percentile) Weight(growth percentile) BMI Smoking Status 128/76 65 5' 9\" (1.753 m) 171 lb (77.6 kg) 25.25 kg/m2 Former Smoker Vitals History BMI and BSA Data Body Mass Index Body Surface Area  
 25.25 kg/m 2 1.94 m 2 Preferred Pharmacy Pharmacy Name Phone [facility] [address] [place], [address] Your Updated Medication List  
  
   
This list is accurate as of: 9/21/17 10:45 AM.  Always use your most recent med list. amLODIPine 5 mg tablet Commonly known as:  Brittany Grebe Take 1 tablet by mouth daily. amoxicillin-clavulanate 875-125 mg per tablet Commonly known as:  AUGMENTIN Take 1 Tab by mouth two (2) times a day. aspirin delayed-release 81 mg tablet Take 81 mg by mouth daily. calcitRIOL 0.5 mcg capsule Commonly known as:  ROCALTROL Take 1 Cap by mouth daily. carvedilol 25 mg tablet Commonly known as:  Cameron Place Take 1 tablet by mouth two (2) times a day. FOSRENOL 1,000 mg chewable tablet Generic drug:  lanthanum Take 1,000 mg by mouth. hydrALAZINE 50 mg tablet Commonly known as:  APRESOLINE Take 25 mg by mouth three (3) times daily. HYDROcodone-acetaminophen 5-325 mg per tablet Commonly known as:  Agueda Chelsy Take 1-2 Tabs by mouth every four (4) hours as needed for Pain. Max Daily Amount: 12 Tabs.  
  
 metOLazone 10 mg tablet Commonly known as:  Demarcus Galley Take 1 Tab by mouth daily. multivitamin tablet Commonly known as:  ONE A DAY Take 1 tablet by mouth daily. PRAVACHOL 40 mg tablet Generic drug:  pravastatin Take 40 mg by mouth nightly. VITAMIN D3 PO Take  by mouth. ZyrTEC 10 mg Cap Generic drug:  Cetirizine Take  by mouth daily. Follow-up Instructions Return in about 6 weeks (around 11/2/2017). To-Do List   
 09/28/2017 Lab:  CBC WITH AUTOMATED DIFF   
  
 09/28/2017 Lab:  METABOLIC PANEL, BASIC   
  
 09/28/2017 Lab:  PROTHROMBIN TIME + INR   
  
 09/29/2017 Cardiac Services:  CARDIAC CATHETERIZATION Please provide this summary of care documentation to your next provider. Your primary care clinician is listed as [address] [place] [address]. If you have any questions after today's visit, please call [phone].

## 2017-09-21 NOTE — PROGRESS NOTES
HISTORY OF PRESENT ILLNESS  General 1981 Latrell Young is a 81 y.o. male. Hypertension   The history is provided by the patient. This is a chronic problem. The problem occurs constantly. The problem has not changed since onset. Pertinent negatives include no chest pain and no shortness of breath. Nothing aggravates the symptoms. Cholesterol Problem   The history is provided by the patient. This is a chronic problem. The problem occurs constantly. Pertinent negatives include no chest pain and no shortness of breath. Shortness of Breath   The history is provided by the patient. This is a recurrent problem. The problem occurs frequently. The problem has been rapidly improving. Pertinent negatives include no fever, no cough, no sputum production, no hemoptysis, no wheezing, no PND, no orthopnea, no chest pain, no vomiting, no rash, no leg swelling and no claudication. Precipitated by: exertion. Dizziness   The history is provided by the patient. This is a recurrent problem. The problem occurs rarely. Pertinent negatives include no chest pain and no shortness of breath. Review of Systems   Constitutional: Negative for chills and fever. HENT: Negative for nosebleeds. Eyes: Negative for blurred vision and double vision. Respiratory: Negative for cough, hemoptysis, sputum production, shortness of breath and wheezing. Cardiovascular: Negative for chest pain, palpitations, orthopnea, claudication, leg swelling and PND. Gastrointestinal: Negative for heartburn, nausea and vomiting. Musculoskeletal: Negative for myalgias. Skin: Negative for rash. Neurological: Positive for dizziness. Negative for weakness. Endo/Heme/Allergies: Does not bruise/bleed easily.      Family History   Problem Relation Age of Onset    Hypertension Sister     Hypertension Brother     Hypertension Father        Past Medical History:   Diagnosis Date    Anemia in chronic kidney disease     Chronic kidney disease     History of kidney transplant x3    History of echocardiogram 01/08/2015    EF 55-60%. No WMA. Mod conc LVH. RVSP 40  mmHg. Mod LAE.  JCARLOS. Mild MR. Mild PI. Mild AoRE. Mild ascend'g Ao dilatation.  HTN (hypertension)     Hypercholesterolemia     Sleep apnea     CPAP       Past Surgical History:   Procedure Laterality Date    HX PARTIAL THYROIDECTOMY      HX RENAL TRANSPLANT      HX SPLENECTOMY         Social History   Substance Use Topics    Smoking status: Former Smoker     Packs/day: 0.50     Years: 10.00     Quit date: 1/6/1975    Smokeless tobacco: Never Used    Alcohol use No       No Known Allergies    Prior to Admission medications    Medication Sig Start Date End Date Taking? Authorizing Provider   aspirin delayed-release 81 mg tablet Take 81 mg by mouth daily. Yes Historical Provider   lanthanum (FOSRENOL) 1,000 mg chewable tablet Take 1,000 mg by mouth. Yes Historical Provider   calcitRIOL (ROCALTROL) 0.5 mcg capsule Take 1 Cap by mouth daily. 2/18/17  Yes Marc Wiggins MD   pravastatin (PRAVACHOL) 40 mg tablet Take 40 mg by mouth nightly. Yes Historical Provider   Cetirizine (ZYRTEC) 10 mg cap Take  by mouth daily. Yes Historical Provider   CHOLECALCIFEROL, VITAMIN D3, (VITAMIN D3 PO) Take  by mouth. Yes Historical Provider   multivitamin (ONE A DAY) tablet Take 1 tablet by mouth daily. Yes Historical Provider   carvedilol (COREG) 25 mg tablet Take 1 tablet by mouth two (2) times a day. 1/6/15  Yes Radhika Bansal MD   metOLazone (ZAROXOLYN) 10 mg tablet Take 1 Tab by mouth daily. 2/22/17   Juwan Macdonald MD   amoxicillin-clavulanate (AUGMENTIN) 875-125 mg per tablet Take 1 Tab by mouth two (2) times a day. 11/25/16   MARCOS Martin   HYDROcodone-acetaminophen (NORCO) 5-325 mg per tablet Take 1-2 Tabs by mouth every four (4) hours as needed for Pain. Max Daily Amount: 12 Tabs.  11/21/16   Enrrique Gil MD   hydrALAZINE (APRESOLINE) 50 mg tablet Take 25 mg by mouth three (3) times daily.    Historical Provider   amLODIPine (NORVASC) 5 mg tablet Take 1 tablet by mouth daily. Patient taking differently: Take 10 mg by mouth daily. 1/6/15   Taylor Lu MD         Visit Vitals    /76    Pulse 65    Ht 5' 9\" (1.753 m)    Wt 77.6 kg (171 lb)    BMI 25.25 kg/m2         Physical Exam   Constitutional: He is oriented to person, place, and time. He appears well-developed and well-nourished. HENT:   Head: Normocephalic and atraumatic. Eyes: Conjunctivae are normal.   Neck: Neck supple. No JVD present. No tracheal deviation present. No thyromegaly present. Cardiovascular: Normal rate, regular rhythm and normal heart sounds. Exam reveals no gallop and no friction rub. No murmur heard. Pulmonary/Chest: No respiratory distress. He has no wheezes. He has rales. He exhibits no tenderness. Abdominal: Soft. There is no tenderness. Musculoskeletal: He exhibits no edema. Neurological: He is alert and oriented to person, place, and time. Skin: Skin is warm and dry. Psychiatric: He has a normal mood and affect. Mr. Orellana Client has a reminder for a \"due or due soon\" health maintenance. I have asked that he contact his primary care provider for follow-up on this health maintenance. [id number]  Left ventricle: Systolic function was normal by EF (biplane method of  disks). Ejection fraction was estimated in the range of 55 % to 60 %. No  obvious wall motion abnormalities identified in the views obtained. Wall  thickness was moderately to markedly increased. There was moderate  concentric hypertrophy. Right ventricle: Systolic pressure was mildly to moderately increased. Estimated peak pressure was in the range of 40 mmHg. Left atrium: The atrium was moderately dilated. Right atrium: The atrium was dilated. Mitral valve: There was mild regurgitation. Aortic valve: The valve was trileaflet. Pulmonic valve: There was mild regurgitation.     Aorta, systemic arteries: The root exhibited mild dilatation. There was  mild dilatation of the ascending aorta.  ekg-2/2017  Sinus  Rhythm   WITHIN NORMAL LIMITS    SUMMARY:3/2017-echo  Left ventricle: Systolic function was normal. Ejection fraction was  estimated in the range of 60 % to 65 %. There were no regional wall motion  abnormalities. There was moderate concentric hypertrophy. Features were  consistent with a pseudonormal left ventricular filling pattern, with  concomitant abnormal relaxation and increased filling pressure (grade 2  diastolic dysfunction). Right ventricle: The ventricle was dilated. Left atrium: The atrium was severely dilated. LA volume index was 73.44  ml/mï¾². Right atrium: The atrium was dilated. Mitral valve: There was mild annular calcification. There was mild  regurgitation. Tricuspid valve: There was mild regurgitation. Pulmonic valve: There was mild regurgitation. Aorta, systemic arteries: The root exhibited upper limit of normal size. Assessment         ICD-10-CM ICD-9-CM    1. Pre-operative cardiovascular examination Z01.810 V72.81 CARDIAC CATHETERIZATION      METABOLIC PANEL, BASIC      CBC WITH AUTOMATED DIFF      PROTHROMBIN TIME + INR    for renal transplant  needs cardiac cath   2. Essential hypertension I10 401.9     controlled   3. ESRD (end stage renal disease) ([facility]) N18.6 585.6 CARDIAC CATHETERIZATION      METABOLIC PANEL, BASIC      CBC WITH AUTOMATED DIFF      PROTHROMBIN TIME + INR    on dialysis now   4. Hypercholesterolemia E78.00 272.0     stable   5. Edema extremities R60.0 782.3     improved on dialysis   needs cardiac cath-rt and left heart cath for pre renal transplant evaluation  THE PATIENT UNDERSTANDS THAT ALTHOUGH RARE, SEVERE  UNEXPECTED COMPLICATIONS CAN OCCUR WITH EACH TYPE OF CARDIAC CATH PROCEDURE.   THESE RISKS INCLUDE,  BUT ARE NOT LIMITED TO: ALLERGIC REACTION, INFECTION, BLEEDING, BLOOD VESSEL INJURY,   KIDNEY INJURY FROM X-RAY DYE, PUNCTURE OF THE HEART/LUNGS,   EMERGENT OPEN HEART SURGERY, HEART ATTACK, STROKE, CARDIAC  ARREST OR DEATH OR NEED FOR EMERGENCY CARDIAC BYPASS SURGERY         Orders Placed This Encounter    METABOLIC PANEL, BASIC     Standing Status:   Future     Standing Expiration Date:   10/21/2017    CBC WITH AUTOMATED DIFF     Standing Status:   Future     Standing Expiration Date:   10/21/2017    PROTHROMBIN TIME + INR     Standing Status:   Future     Standing Expiration Date:   10/21/2017    CARDIAC CATHETERIZATION     Standing Status:   Future     Standing Expiration Date:   3/24/2018     Order Specific Question:   Reason for Exam:     Answer:   see diagnosis       Follow-up Disposition:  Return in about 6 weeks (around 11/2/2017).

## 2017-09-26 ENCOUNTER — HOSPITAL ENCOUNTER (OUTPATIENT)
Dept: CARDIAC CATH/INVASIVE PROCEDURES | Age: 72
Discharge: HOME | End: 2017-09-26
Attending: INTERNAL MEDICINE | Admitting: INTERNAL MEDICINE
Payer: MEDICARE

## 2017-09-26 VITALS
BODY MASS INDEX: 26.07 KG/M2 | WEIGHT: 176 LBS | SYSTOLIC BLOOD PRESSURE: 164 MMHG | OXYGEN SATURATION: 100 % | HEIGHT: 69 IN | DIASTOLIC BLOOD PRESSURE: 94 MMHG | RESPIRATION RATE: 18 BRPM | HEART RATE: 60 BPM

## 2017-09-26 PROBLEM — Z94.0 RENAL TRANSPLANT RECIPIENT: Status: ACTIVE | Noted: 2017-09-26

## 2017-09-26 PROCEDURE — 77030013797 CARDIAC CATHETERIZATION

## 2017-09-26 PROCEDURE — 74011636320 HC RX REV CODE- 636/320: Performed by: INTERNAL MEDICINE

## 2017-09-26 PROCEDURE — 74011000250 HC RX REV CODE- 250: Performed by: INTERNAL MEDICINE

## 2017-09-26 PROCEDURE — 74011250637 HC RX REV CODE- 250/637

## 2017-09-26 PROCEDURE — 74011250636 HC RX REV CODE- 250/636: Performed by: INTERNAL MEDICINE

## 2017-09-26 RX ORDER — MIDAZOLAM HYDROCHLORIDE 1 MG/ML
.25-2 INJECTION, SOLUTION INTRAMUSCULAR; INTRAVENOUS
Status: DISCONTINUED | OUTPATIENT
Start: 2017-09-26 | End: 2017-09-26 | Stop reason: HOSPADM

## 2017-09-26 RX ORDER — GUAIFENESIN 100 MG/5ML
LIQUID (ML) ORAL
Status: COMPLETED
Start: 2017-09-26 | End: 2017-09-26

## 2017-09-26 RX ORDER — SODIUM CHLORIDE 0.9 % (FLUSH) 0.9 %
5-10 SYRINGE (ML) INJECTION AS NEEDED
Status: DISCONTINUED | OUTPATIENT
Start: 2017-09-26 | End: 2017-09-26 | Stop reason: HOSPADM

## 2017-09-26 RX ORDER — HEPARIN SODIUM 200 [USP'U]/100ML
500 INJECTION, SOLUTION INTRAVENOUS ONCE
Status: COMPLETED | OUTPATIENT
Start: 2017-09-26 | End: 2017-09-26

## 2017-09-26 RX ORDER — HEPARIN SODIUM 1000 [USP'U]/ML
1000-10000 INJECTION, SOLUTION INTRAVENOUS; SUBCUTANEOUS
Status: DISCONTINUED | OUTPATIENT
Start: 2017-09-26 | End: 2017-09-26 | Stop reason: HOSPADM

## 2017-09-26 RX ORDER — NITROGLYCERIN 0.4 MG/1
0.4 TABLET SUBLINGUAL
Status: DISCONTINUED | OUTPATIENT
Start: 2017-09-26 | End: 2017-09-26 | Stop reason: HOSPADM

## 2017-09-26 RX ORDER — ACETAMINOPHEN 325 MG/1
650 TABLET ORAL
Status: DISCONTINUED | OUTPATIENT
Start: 2017-09-26 | End: 2017-09-26 | Stop reason: HOSPADM

## 2017-09-26 RX ORDER — SODIUM CHLORIDE 0.9 % (FLUSH) 0.9 %
5-10 SYRINGE (ML) INJECTION EVERY 8 HOURS
Status: DISCONTINUED | OUTPATIENT
Start: 2017-09-26 | End: 2017-09-26 | Stop reason: HOSPADM

## 2017-09-26 RX ORDER — LIDOCAINE HYDROCHLORIDE 10 MG/ML
1-60 INJECTION, SOLUTION EPIDURAL; INFILTRATION; INTRACAUDAL; PERINEURAL
Status: DISCONTINUED | OUTPATIENT
Start: 2017-09-26 | End: 2017-09-26 | Stop reason: HOSPADM

## 2017-09-26 RX ORDER — FENTANYL CITRATE 50 UG/ML
12.5-1 INJECTION, SOLUTION INTRAMUSCULAR; INTRAVENOUS
Status: DISCONTINUED | OUTPATIENT
Start: 2017-09-26 | End: 2017-09-26 | Stop reason: HOSPADM

## 2017-09-26 RX ADMIN — MIDAZOLAM HYDROCHLORIDE 1 MG: 1 INJECTION, SOLUTION INTRAMUSCULAR; INTRAVENOUS at 12:53

## 2017-09-26 RX ADMIN — ASPIRIN 81 MG 81 MG: 81 TABLET ORAL at 11:40

## 2017-09-26 RX ADMIN — FENTANYL CITRATE 25 MCG: 50 INJECTION INTRAMUSCULAR; INTRAVENOUS at 12:53

## 2017-09-26 RX ADMIN — HEPARIN SODIUM 1000 UNITS: 200 INJECTION, SOLUTION INTRAVENOUS at 12:55

## 2017-09-26 RX ADMIN — LIDOCAINE HYDROCHLORIDE 10 ML: 10 INJECTION, SOLUTION EPIDURAL; INFILTRATION; INTRACAUDAL; PERINEURAL at 13:05

## 2017-09-26 RX ADMIN — IOPAMIDOL 65 ML: 612 INJECTION, SOLUTION INTRAVENOUS at 14:50

## 2017-09-26 NOTE — IP AVS SNAPSHOT
Roderick Elders 
 
 
 [address] Patient: [facility] ADAM PICKETT MRN: [id number] XDE:1/4/5666 You are allergic to the following No active allergies Recent Documentation Height Weight BMI Smoking Status 1.753 m 79.8 kg 25.99 kg/m2 Former Smoker Emergency Contacts Name Discharge Info Relation Home Work Mobile [address] CAREGIVER [3] Daughter [21] [phone] [address]  Son [22] [phone] Jesusita Menjivar  Child [2] [phone] About your hospitalization You were admitted on:  September 26, 2017 You last received care in the:  [facility] CATH HOLDING You were discharged on:  September 26, 2017 Unit phone number:  [phone] Why you were hospitalized Your primary diagnosis was:  Renal Transplant Recipient Your diagnoses also included:  Htn (Hypertension), Esrd (End Stage Renal Disease) ([facility]) Providers Seen During Your Hospitalizations Provider Role Specialty Primary office phone Raul Christopher MD Attending Provider Cardiology [phone] Your Primary Care Physician (PCP) Primary Care Physician Office Phone Office Fax Daniel Nunez [phone] Follow-up Information Follow up With Details Comments Contact Info [address], [place]   [address] [place] [address] [address] [facility] [address] [phone] Your Appointments Tuesday October 31, 2017 10:00 AM EDT  
ESTABLISHED PATIENT with Cinthya Charles MD  
[facility]) [address] [place] [address], [address] [facility] [address] [phone] Current Discharge Medication List  
  
CONTINUE these medications which have CHANGED Dose & Instructions Dispensing Information Comments Morning Noon Evening Bedtime  
 amLODIPine 5 mg tablet Commonly known as:  Shelbie Vaz What changed:  how much to take Your last dose was: Your next dose is:    
   
   
 Dose:  5 mg Take 1 tablet by mouth daily. Quantity:  30 tablet Refills:  5 CONTINUE these medications which have NOT CHANGED Dose & Instructions Dispensing Information Comments Morning Noon Evening Bedtime  
 aspirin delayed-release 81 mg tablet Your last dose was: Your next dose is:    
   
   
 Dose:  81 mg Take 81 mg by mouth daily. Refills:  0  
     
   
   
   
  
 calcitRIOL 0.5 mcg capsule Commonly known as:  ROCALTROL Your last dose was: Your next dose is:    
   
   
 Dose:  0.5 mcg Take 1 Cap by mouth daily. Quantity:  30 Cap Refills:  0  
     
   
   
   
  
 carvedilol 25 mg tablet Commonly known as:  Maybelle Pallas Your last dose was: Your next dose is:    
   
   
 Dose:  25 mg Take 1 tablet by mouth two (2) times a day. Quantity:  60 tablet Refills:  5 FOSRENOL 1,000 mg chewable tablet Generic drug:  lanthanum Your last dose was: Your next dose is:    
   
   
 Dose:  1000 mg Take 1,000 mg by mouth. Refills:  0  
     
   
   
   
  
 hydrALAZINE 50 mg tablet Commonly known as:  APRESOLINE Your last dose was: Your next dose is:    
   
   
 Dose:  25 mg Take 25 mg by mouth three (3) times daily. Refills:  0 HYDROcodone-acetaminophen 5-325 mg per tablet Commonly known as:  Mihaela Hiram Your last dose was: Your next dose is:    
   
   
 Dose:  1-2 Tab Take 1-2 Tabs by mouth every four (4) hours as needed for Pain. Max Daily Amount: 12 Tabs. Quantity:  40 Tab Refills:  0  
     
   
   
   
  
 metOLazone 10 mg tablet Commonly known as:  Alecia Aguilar Your last dose was: Your next dose is:    
   
   
 Dose:  10 mg Take 1 Tab by mouth daily. Quantity:  30 Tab Refills:  1  
     
   
   
   
  
 multivitamin tablet Commonly known as:  ONE A DAY Your last dose was: Your next dose is:    
   
   
 Dose:  1 Tab Take 1 tablet by mouth daily. Refills:  0 PRAVACHOL 40 mg tablet Generic drug:  pravastatin Your last dose was: Your next dose is:    
   
   
 Dose:  40 mg Take 40 mg by mouth nightly. Refills:  0  
     
   
   
   
  
 VITAMIN D3 PO Your last dose was: Your next dose is: Take  by mouth. Refills:  0 ZyrTEC 10 mg Cap Generic drug:  Cetirizine Your last dose was: Your next dose is: Take  by mouth daily. Refills:  0 STOP taking these medications   
 amoxicillin-clavulanate 875-125 mg per tablet Commonly known as:  AUGMENTIN Discharge Instructions HEART CATHETERIZATION/ANGIOGRAPHY DISCHARGE INSTRUCTIONS 1. Check puncture site frequently for swelling or bleeding. If there is any bleeding, lie down and apply pressure over the area with a clean towel or washcloth. Notify your doctor for any redness, swelling, drainage, or oozing from the puncture site. Notify your doctor for any fever or chills. 2. If the extremity becomes cold, numb, or painful call Dr. Palak Fairchild 3. Activity should be limited for the next 48 hours. Climb stairs as little as possible and avoid any stooping, bending, or strenuous activity for 48 hours. No heavy lifting (anything over 10 pounds) for 3 days. 4. You may resume your usual diet. Drink more fluids than usual. 
5. Have a responsible person drive you home and stay with you for at least 24 hours after your heart catheterization/angiography. 6. You may remove bandage from your Right and Groin in 24 hours. You may shower in 24 hours. No tub baths, hot tubs, or swimming for 1 week.  Do not place any lotions, creams, powders, or ointments over puncture site for 1 week. You may place a clean band-aid over the puncture site each day for 5 days. Change daily. I have read the above instructions and have had the opportunity to ask questions. Patient: ________________________   Date: 9/26/2017 Witness: _______________________   Date: 9/26/2017 DISCHARGE SUMMARY from Nurse The following personal items are in your possession at time of discharge: 
 
  
Visual Aid: None PATIENT INSTRUCTIONS: 
 
After general anesthesia or intravenous sedation, for 24 hours or while taking prescription Narcotics: · Limit your activities · Do not drive and operate hazardous machinery · Do not make important personal or business decisions · Do  not drink alcoholic beverages · If you have not urinated within 8 hours after discharge, please contact your surgeon on call. Report the following to your surgeon: 
· Excessive pain, swelling, redness or odor of or around the surgical area · Temperature over 100.5 · Nausea and vomiting lasting longer than 4 hours or if unable to take medications · Any signs of decreased circulation or nerve impairment to extremity: change in color, persistent  numbness, tingling, coldness or increase pain · Any questions What to do at Home: 
Recommended activity: Activity as tolerated and no driving for today, These are general instructions for a healthy lifestyle: No smoking/ No tobacco products/ Avoid exposure to second hand smoke Surgeon General's Warning:  Quitting smoking now greatly reduces serious risk to your health. Obesity, smoking, and sedentary lifestyle greatly increases your risk for illness A healthy diet, regular physical exercise & weight monitoring are important for maintaining a healthy lifestyle You may be retaining fluid if you have a history of heart failure or if you experience any of the following symptoms:  Weight gain of 3 pounds or more overnight or 5 pounds in a week, increased swelling in our hands or feet or shortness of breath while lying flat in bed. Please call your doctor as soon as you notice any of these symptoms; do not wait until your next office visit. Recognize signs and symptoms of STROKE: 
 
F-face looks uneven A-arms unable to move or move unevenly S-speech slurred or non-existent T-time-call 911 as soon as signs and symptoms begin-DO NOT go Back to bed or wait to see if you get better-TIME IS BRAIN. Warning Signs of HEART ATTACK Call 911 if you have these symptoms: 
? Chest discomfort. Most heart attacks involve discomfort in the center of the chest that lasts more than a few minutes, or that goes away and comes back. It can feel like uncomfortable pressure, squeezing, fullness, or pain. ? Discomfort in other areas of the upper body. Symptoms can include pain or discomfort in one or both arms, the back, neck, jaw, or stomach. ? Shortness of breath with or without chest discomfort. ? Other signs may include breaking out in a cold sweat, nausea, or lightheadedness. Don't wait more than five minutes to call [address]! Fast action can save your life. Calling 911 is almost always the fastest way to get lifesaving treatment. Emergency Medical Services staff can begin treatment when they arrive  up to an hour sooner than if someone gets to the hospital by car. The discharge information has been reviewed with the patient. The patient verbalized understanding. Discharge medications reviewed with the patient and appropriate educational materials and side effects teaching were provided. Discharge Orders None General Information Please provide this summary of care documentation to your next provider.  
  
  
    
    
 Patient Signature: ____________________________________________________________ Date:  ____________________________________________________________  
  
Elizabethann Glad Provider Signature:  ____________________________________________________________ Date:  ____________________________________________________________

## 2017-09-26 NOTE — PROGRESS NOTES
Cath holding summary    Patient escorted to cath holding from waiting area ambulatory, alert and oriented x 4, voicing no complaints. Changed into gown and placed on monitor. NPO since MN. Lab results, med rec and H&P reviewed on chart. PIV x 2 inserted without difficulty. Family to bedside.

## 2017-09-26 NOTE — PROGRESS NOTES
TRANSFER - OUT REPORT:    Verbal report given to Chula Israel RN(name) on [facility]  being transferred to [facility](unit) for routine post - op       Report consisted of patients Situation, Background, Assessment and   Recommendations(SBAR). Information from the following report(s) SBAR and MAR was reviewed with the receiving nurse. Lines:   Peripheral IV 09/26/17 Right Wrist (Active)       Peripheral IV 09/26/17 Right Antecubital (Active)        Opportunity for questions and clarification was provided.       Patient transported with:   [facility]

## 2017-09-26 NOTE — DISCHARGE INSTRUCTIONS
HEART CATHETERIZATION/ANGIOGRAPHY DISCHARGE INSTRUCTIONS    1. Check puncture site frequently for swelling or bleeding. If there is any bleeding, lie down and apply pressure over the area with a clean towel or washcloth. Notify your doctor for any redness, swelling, drainage, or oozing from the puncture site. Notify your doctor for any fever or chills. 2. If the extremity becomes cold, numb, or painful call Dr. Catalina Franco  3. Activity should be limited for the next 48 hours. Climb stairs as little as possible and avoid any stooping, bending, or strenuous activity for 48 hours. No heavy lifting (anything over 10 pounds) for 3 days. 4. You may resume your usual diet. Drink more fluids than usual.  5. Have a responsible person drive you home and stay with you for at least 24 hours after your heart catheterization/angiography. 6. You may remove bandage from your Right and Groin in 24 hours. You may shower in 24 hours. No tub baths, hot tubs, or swimming for 1 week. Do not place any lotions, creams, powders, or ointments over puncture site for 1 week. You may place a clean band-aid over the puncture site each day for 5 days. Change daily. I have read the above instructions and have had the opportunity to ask questions. Patient: ________________________   Date: 9/26/2017    Witness: _______________________   Date: 9/26/2017     DISCHARGE SUMMARY from Nurse    The following personal items are in your possession at time of discharge:       Visual Aid: None                            PATIENT INSTRUCTIONS:    After general anesthesia or intravenous sedation, for 24 hours or while taking prescription Narcotics:  · Limit your activities  · Do not drive and operate hazardous machinery  · Do not make important personal or business decisions  · Do  not drink alcoholic beverages  · If you have not urinated within 8 hours after discharge, please contact your surgeon on call.     Report the following to your surgeon:  · Excessive pain, swelling, redness or odor of or around the surgical area  · Temperature over 100.5  · Nausea and vomiting lasting longer than 4 hours or if unable to take medications  · Any signs of decreased circulation or nerve impairment to extremity: change in color, persistent  numbness, tingling, coldness or increase pain  · Any questions        What to do at Home:  Recommended activity: Activity as tolerated and no driving for today,     These are general instructions for a healthy lifestyle:    No smoking/ No tobacco products/ Avoid exposure to second hand smoke    Surgeon General's Warning:  Quitting smoking now greatly reduces serious risk to your health. Obesity, smoking, and sedentary lifestyle greatly increases your risk for illness    A healthy diet, regular physical exercise & weight monitoring are important for maintaining a healthy lifestyle    You may be retaining fluid if you have a history of heart failure or if you experience any of the following symptoms:  Weight gain of 3 pounds or more overnight or 5 pounds in a week, increased swelling in our hands or feet or shortness of breath while lying flat in bed. Please call your doctor as soon as you notice any of these symptoms; do not wait until your next office visit. Recognize signs and symptoms of STROKE:    F-face looks uneven    A-arms unable to move or move unevenly    S-speech slurred or non-existent    T-time-call 911 as soon as signs and symptoms begin-DO NOT go       Back to bed or wait to see if you get better-TIME IS BRAIN. Warning Signs of HEART ATTACK     Call 911 if you have these symptoms:   Chest discomfort. Most heart attacks involve discomfort in the center of the chest that lasts more than a few minutes, or that goes away and comes back. It can feel like uncomfortable pressure, squeezing, fullness, or pain.  Discomfort in other areas of the upper body.  Symptoms can include pain or discomfort in one or both arms, the back, neck, jaw, or stomach.  Shortness of breath with or without chest discomfort.  Other signs may include breaking out in a cold sweat, nausea, or lightheadedness. Don't wait more than five minutes to call 911 - MINUTES MATTER! Fast action can save your life. Calling 911 is almost always the fastest way to get lifesaving treatment. Emergency Medical Services staff can begin treatment when they arrive -- up to an hour sooner than if someone gets to the hospital by car. The discharge information has been reviewed with the patient. The patient verbalized understanding. Discharge medications reviewed with the patient and appropriate educational materials and side effects teaching were provided.

## 2017-09-26 NOTE — H&P
Pt seen and examined, chart reviewed prior to procedure  No changes since last office visit. See office visit from 9/21/2017      Office Visit     9/21/2017  Cardiology Aren Arevalo MD   Cardiology    Pre-operative cardiovascular examination +4 more   Dx    Hypertension , Cholesterol Problem   Reason for visit    Progress Notes      [address] [facility] Eve is a 81 y.o. male.     Hypertension   The history is provided by the patient. This is a chronic problem. The problem occurs constantly. The problem has not changed since onset. Pertinent negatives include no chest pain and no shortness of breath. Nothing aggravates the symptoms. Cholesterol Problem   The history is provided by the patient. This is a chronic problem. The problem occurs constantly. Pertinent negatives include no chest pain and no shortness of breath. Shortness of Breath   The history is provided by the patient. This is a recurrent problem. The problem occurs frequently. The problem has been rapidly improving. Pertinent negatives include no fever, no cough, no sputum production, no hemoptysis, no wheezing, no PND, no orthopnea, no chest pain, no vomiting, no rash, no leg swelling and no claudication. Precipitated by: exertion. Dizziness   The history is provided by the patient. This is a recurrent problem. The problem occurs rarely. Pertinent negatives include no chest pain and no shortness of breath.         Review of Systems   Constitutional: Negative for chills and fever. HENT: Negative for nosebleeds. Eyes: Negative for blurred vision and double vision. Respiratory: Negative for cough, hemoptysis, sputum production, shortness of breath and wheezing. Cardiovascular: Negative for chest pain, palpitations, orthopnea, claudication, leg swelling and PND. Gastrointestinal: Negative for heartburn, nausea and vomiting. Musculoskeletal: Negative for myalgias. Skin: Negative for rash. Neurological: Positive for dizziness. Negative for weakness. Endo/Heme/Allergies: Does not bruise/bleed easily.            Family History   Problem Relation Age of Onset    Hypertension Sister      Hypertension Brother      Hypertension Father                Past Medical History:   Diagnosis Date    Anemia in chronic kidney disease      Chronic kidney disease       History of kidney transplant x3    History of echocardiogram 01/08/2015     EF 55-60%. No WMA. Mod conc LVH. RVSP 40  mmHg. Mod LAE.  JCARLOS. Mild MR. Mild PI. Mild AoRE. Mild ascend'g Ao dilatation.  HTN (hypertension)      Hypercholesterolemia      Sleep apnea       CPAP               Past Surgical History:   Procedure Laterality Date    HX PARTIAL THYROIDECTOMY        HX RENAL TRANSPLANT        HX SPLENECTOMY                   Social History   Substance Use Topics    Smoking status: Former Smoker       Packs/day: 0.50       Years: 10.00       Quit date: 1/6/1975    Smokeless tobacco: Never Used    Alcohol use No         No Known Allergies             Prior to Admission medications    Medication Sig Start Date End Date Taking? Authorizing Provider   aspirin delayed-release 81 mg tablet Take 81 mg by mouth daily.     Yes Historical Provider   lanthanum (FOSRENOL) 1,000 mg chewable tablet Take 1,000 mg by mouth.     Yes Historical Provider   calcitRIOL (ROCALTROL) 0.5 mcg capsule Take 1 Cap by mouth daily. 2/18/17   Yes Daryle Dandy, MD   pravastatin (PRAVACHOL) 40 mg tablet Take 40 mg by mouth nightly.     Yes Historical Provider   Cetirizine (ZYRTEC) 10 mg cap Take  by mouth daily.     Yes Historical Provider   CHOLECALCIFEROL, VITAMIN D3, (VITAMIN D3 PO) Take  by mouth.     Yes Historical Provider   multivitamin (ONE A DAY) tablet Take 1 tablet by mouth daily.     Yes Historical Provider   carvedilol (COREG) 25 mg tablet Take 1 tablet by mouth two (2) times a day.  1/6/15   Yes Pollo Donohue MD   metOLazone (ZAROXOLYN) 10 mg tablet Take 1 Tab by mouth daily. 2/22/17     Mario Alberto Nichole MD   amoxicillin-clavulanate (AUGMENTIN) 875-125 mg per tablet Take 1 Tab by mouth two (2) times a day. 11/25/16     MARCOS Velasco   HYDROcodone-acetaminophen (NORCO) 5-325 mg per tablet Take 1-2 Tabs by mouth every four (4) hours as needed for Pain. Max Daily Amount: 12 Tabs. 11/21/16     Paola Dillon MD   hydrALAZINE (APRESOLINE) 50 mg tablet Take 25 mg by mouth three (3) times daily.       Historical Provider   amLODIPine (NORVASC) 5 mg tablet Take 1 tablet by mouth daily. Patient taking differently: Take 10 mg by mouth daily. 1/6/15     Aurea Schmidt MD                 Visit Vitals    /76    Pulse 65    Ht 5' 9\" (1.753 m)    Wt 77.6 kg (171 lb)    BMI 25.25 kg/m2            Physical Exam   Constitutional: He is oriented to person, place, and time. He appears well-developed and well-nourished. HENT:   Head: Normocephalic and atraumatic. Eyes: Conjunctivae are normal.   Neck: Neck supple. No JVD present. No tracheal deviation present. No thyromegaly present. Cardiovascular: Normal rate, regular rhythm and normal heart sounds. Exam reveals no gallop and no friction rub. No murmur heard. Pulmonary/Chest: No respiratory distress. He has no wheezes. He has rales. He exhibits no tenderness. Abdominal: Soft. There is no tenderness. Musculoskeletal: He exhibits no edema. Neurological: He is alert and oriented to person, place, and time. Skin: Skin is warm and dry. Psychiatric: He has a normal mood and affect.         Mr. Trell Duffy has a reminder for a \"due or due soon\" health maintenance. I have asked that he contact his primary care provider for follow-up on this health maintenance. [id number]  Left ventricle: Systolic function was normal by EF (biplane method of  disks). Ejection fraction was estimated in the range of 55 % to 60 %. No  obvious wall motion abnormalities identified in the views obtained. Wall  thickness was moderately to markedly increased. There was moderate  concentric hypertrophy. Right ventricle: Systolic pressure was mildly to moderately increased. Estimated peak pressure was in the range of 40 mmHg. Left atrium: The atrium was moderately dilated. Right atrium: The atrium was dilated. Mitral valve: There was mild regurgitation. Aortic valve: The valve was trileaflet. Pulmonic valve: There was mild regurgitation. Aorta, systemic arteries: The root exhibited mild dilatation. There was  mild dilatation of the ascending aorta.  ekg-2/2017  Sinus  Rhythm   WITHIN NORMAL LIMITS     SUMMARY:3/2017-echo  Left ventricle: Systolic function was normal. Ejection fraction was  estimated in the range of 60 % to 65 %. There were no regional wall motion  abnormalities. There was moderate concentric hypertrophy. Features were  consistent with a pseudonormal left ventricular filling pattern, with  concomitant abnormal relaxation and increased filling pressure (grade 2  diastolic dysfunction). Right ventricle: The ventricle was dilated. Left atrium: The atrium was severely dilated. LA volume index was 73.44  ml/mï¾². Right atrium: The atrium was dilated. Mitral valve: There was mild annular calcification. There was mild  regurgitation. Tricuspid valve: There was mild regurgitation. Pulmonic valve: There was mild regurgitation. Aorta, systemic arteries: The root exhibited upper limit of normal size. Assessment             ICD-10-CM ICD-9-CM     1. Pre-operative cardiovascular examination Z01.810 V72.81 CARDIAC CATHETERIZATION         METABOLIC PANEL, BASIC         CBC WITH AUTOMATED DIFF         PROTHROMBIN TIME + INR     for renal transplant  needs cardiac cath   2. Essential hypertension I10 401. 9       controlled   3.  ESRD (end stage renal disease) ([facility]) N18.6 585.6 CARDIAC CATHETERIZATION         METABOLIC PANEL, BASIC         CBC WITH AUTOMATED DIFF         PROTHROMBIN TIME + INR     on dialysis now   4. Hypercholesterolemia E78.00 272.0       stable   5. Edema extremities R60.0 782. 3       improved on dialysis   needs cardiac cath-rt and left heart cath for pre renal transplant evaluation  THE PATIENT UNDERSTANDS THAT ALTHOUGH RARE, SEVERE  UNEXPECTED COMPLICATIONS CAN OCCUR WITH EACH TYPE OF CARDIAC CATH PROCEDURE. THESE RISKS INCLUDE,  BUT ARE NOT LIMITED TO: ALLERGIC REACTION, INFECTION, BLEEDING, BLOOD VESSEL INJURY,   KIDNEY INJURY FROM X-RAY DYE, PUNCTURE OF THE HEART/LUNGS,   EMERGENT OPEN HEART SURGERY, HEART ATTACK, STROKE, CARDIAC  ARREST OR DEATH OR NEED FOR EMERGENCY CARDIAC BYPASS SURGERY                  Orders Placed This Encounter    METABOLIC PANEL, BASIC       Standing Status:   Future       Standing Expiration Date:   10/21/2017    CBC WITH AUTOMATED DIFF       Standing Status:   Future       Standing Expiration Date:   10/21/2017    PROTHROMBIN TIME + INR       Standing Status:   Future       Standing Expiration Date:   10/21/2017    CARDIAC CATHETERIZATION       Standing Status:   Future       Standing Expiration Date:   3/24/2018       Order Specific Question:   Reason for Exam:       Answer:   see diagnosis         Follow-up Disposition:  Return in about 6 weeks (around 11/2/2017).

## 2017-09-26 NOTE — PROGRESS NOTES
TRANSFER - IN REPORT:    Verbal report received from Rosemary Heart RN(name) on [address]  being received from [facility](unit) for ordered procedure      Report consisted of patients Situation, Background, Assessment and   Recommendations(SBAR). Information from the following report(s) SBAR and MAR was reviewed with the receiving nurse. Opportunity for questions and clarification was provided. Assessment completed upon patients arrival to unit and care assumed.

## 2017-09-26 NOTE — PROGRESS NOTES
[address] arrived to cath holding awake and alert, vital signs stable, No complaint of pain, 4F and 6F sheath to right groin clean dry and intact. Bhaskar Red NOTE:    Patient informed of procedure and questions answered with review. 4F sheath in  Femoral artery and 6F sheath in right femoral vein  pulled at 1345 by Chula Israel RN. Hand hold and good hemostasis, with manual compression to site. Handhold for 20 minutes. Gauze and transparent dressing applied to site. No bleeding, no hematoma, no pain/discomfort at site. Groin instructions provided with review. Patient verbalized understanding.

## 2017-09-26 NOTE — BRIEF OP NOTE
Cardiac Cathetherization Note:    PreOpDiagnosis: pre renal transplant eval    Anesthesia used : Moderate Sedation: Used IV fentanyl and IV versed. Details of doses are as documented in nursing flow sheet. Patient continuously monitored during the procedure. Total time was  35 minutes. Findings/PostOp Diagnosis:  1. PCW 7 ; PA 35/10/16; RV 35/6(edp); RA 3;  2. CO Miguel 7.8 LPM; TD 5.6 LPM  3. CI Miguel 3.9 LPM/m2; TD 2.9 LPM/m2  4. /16(edp); /70  5. Simultaneous LV/Ao peak gradient 0  6. Coronaries: 70% prox/ostial D2    Plan:  1.  Med Rx for CAD; OK for renal Tx    Closure: manual 4F RFA, 6F RFV  Estimated Blood Loss: Minimal  Specimens: None  Assistants: Per [facility]  Complications: None    Other coronary anatomy and procedural details as per full dictated note    Mariaelena Poe MD  9/26/2017

## 2017-09-26 NOTE — PROCEDURES
[address] [facility] CATH LAB    Name:  Mitra Gaming  MR#:  [id number]  :  1945  Account #:  [de-identified]  Date of Adm:  2017  Date of Service:  2017      ESTIMATED BLOOD LOSS: 10cc    SPECIMENS REMOVED: none    PRIMARY CARE PHYSICIAN: Lilian Grimes MD    INDICATION: Prerenal transplant evaluation as requested by the  transplant center. PROCEDURE NOTE: The patient was brought from home as an  outpatient. He was taken to the catheterization laboratory, draped and  prepared in the usual aseptic precautions. The right femoral artery and  vein were cannulated under vascular ultrasound guidance without any  complications. Using 4-[place] catheters for the arterial system and 6-  [place] for the venous system, right and left heart catheterization,  coronary and LV angiography was completed without complications. At  the end, all the catheters were removed and the patient transferred to  the holding area where sheaths will be removed and hemostasis will  be achieved manually. HEMODYNAMICS: LVEDP is 16. PCW mean of 7. PA 35/10/16. RV  35/6 (EDP). RA mean is 3. Aortic pressure 160/70. No gradient at  aortic valve at the time of pullback. Cardiac output by Miguel method is 7.8 L/minute and by thermodilution is  5.6 L/minute. Cardiac index by Miguel is 3.99 and by thermodilution is 2.9. ANGIOGRAPHIC FINDINGS:  LV gram: It was done with hand injection using an AR Mod catheter in  KING view. It reveals normal wall motion and ejection fraction in the  range of 60% to 65% and no significant mitral regurgitation is seen. Right coronary artery is a large and dominant vessel in this patient,  giving its usual branches, divides into a PDA and a posterolateral  branch. Right coronary and all the branches have no significant  angiographic disease. Left main is a large and normal vessel. LAD arising from left main.  It gives rise to multiple septal perforators  and 2 diagonal arteries. Both the diagonals are large in this patient. The second diagonal artery has a proximal/ostial 70% diffuse stenosis  with complete flow. Left circumflex artery arising from left main. It gives rise to a large OM1  artery and distally 2 small posterolateral branches. OM1 artery divides  into 2 moderate-sized vessels. There is a small second obtuse  marginal artery as well. Left circumflex and both obtuse marginals, as  well as both small posterolateral branches, have mild luminal  irregularities without any critical disease. CONCLUSION:  1. Normal pulmonary artery pressure. 2. High normal left ventricular end-diastolic pressure without any aortic  valve gradient. 3. One-vessel coronary artery disease with 70% proximal and ostial  stenosis of second diagonal artery without any flow limitation  angiographically. 4. Normal left ventricular wall motion and ejection fraction in the range  of 60% to 65%. DISCUSSION AND RECOMMENDATIONS: Risk factor modification  will be advised due to noncritical coronary artery disease. The report  will be sent to the transplant center for a final clearance for transplant.         MD UMANG Cronin / Fernanda Rowland  D:  09/26/2017   13:36  T:  09/26/2017   14:06  Job #:  [id number]

## 2017-10-31 ENCOUNTER — OFFICE VISIT (OUTPATIENT)
Dept: CARDIOLOGY CLINIC | Age: 72
End: 2017-10-31

## 2017-10-31 VITALS
BODY MASS INDEX: 27.25 KG/M2 | SYSTOLIC BLOOD PRESSURE: 119 MMHG | DIASTOLIC BLOOD PRESSURE: 73 MMHG | WEIGHT: 184 LBS | HEIGHT: 69 IN | HEART RATE: 67 BPM

## 2017-10-31 DIAGNOSIS — Z94.0 RENAL TRANSPLANT RECIPIENT: ICD-10-CM

## 2017-10-31 DIAGNOSIS — I25.10 CORONARY ARTERY DISEASE INVOLVING NATIVE CORONARY ARTERY OF NATIVE HEART WITHOUT ANGINA PECTORIS: Primary | ICD-10-CM

## 2017-10-31 DIAGNOSIS — I10 ESSENTIAL HYPERTENSION: ICD-10-CM

## 2017-10-31 DIAGNOSIS — E78.00 HYPERCHOLESTEROLEMIA: ICD-10-CM

## 2017-10-31 DIAGNOSIS — N18.6 ESRD (END STAGE RENAL DISEASE) (HCC): ICD-10-CM

## 2017-10-31 NOTE — PROGRESS NOTES
HISTORY OF PRESENT ILLNESS  General 1981 Latrell Young is a 81 y.o. male. HPI Comments: Patient is here for follow up of diagnostic tests. Results will be discussed. Hospital Follow Up   The history is provided by the patient. Pertinent negatives include no chest pain and no shortness of breath. Hypertension   The history is provided by the patient. This is a chronic problem. The problem occurs constantly. The problem has not changed since onset. Pertinent negatives include no chest pain and no shortness of breath. Nothing aggravates the symptoms. Cholesterol Problem   The history is provided by the patient. This is a chronic problem. The problem occurs constantly. Pertinent negatives include no chest pain and no shortness of breath. Shortness of Breath   The history is provided by the patient. This is a recurrent problem. The problem occurs frequently. The problem has been rapidly improving. Pertinent negatives include no fever, no cough, no sputum production, no hemoptysis, no wheezing, no PND, no orthopnea, no chest pain, no vomiting, no rash, no leg swelling and no claudication. Precipitated by: exertion. Dizziness   The history is provided by the patient. This is a recurrent problem. The problem occurs rarely. Pertinent negatives include no chest pain and no shortness of breath. Review of Systems   Constitutional: Negative for chills and fever. HENT: Negative for nosebleeds. Eyes: Negative for blurred vision and double vision. Respiratory: Negative for cough, hemoptysis, sputum production, shortness of breath and wheezing. Cardiovascular: Negative for chest pain, palpitations, orthopnea, claudication, leg swelling and PND. Gastrointestinal: Negative for heartburn, nausea and vomiting. Musculoskeletal: Negative for myalgias. Skin: Negative for rash. Neurological: Positive for dizziness. Negative for weakness. Endo/Heme/Allergies: Does not bruise/bleed easily.      Family History Problem Relation Age of Onset    Hypertension Sister     Hypertension Brother     Hypertension Father        Past Medical History:   Diagnosis Date    Anemia in chronic kidney disease     Chronic kidney disease     History of kidney transplant x3    History of echocardiogram 01/08/2015    EF 55-60%. No WMA. Mod conc LVH. RVSP 40  mmHg. Mod LAE.  JCARLOS. Mild MR. Mild PI. Mild AoRE. Mild ascend'g Ao dilatation.  HTN (hypertension)     Hypercholesterolemia     Sleep apnea     CPAP       Past Surgical History:   Procedure Laterality Date    HX PARTIAL THYROIDECTOMY      HX RENAL TRANSPLANT      HX SPLENECTOMY         Social History   Substance Use Topics    Smoking status: Former Smoker     Packs/day: 0.50     Years: 10.00     Quit date: 1/6/1975    Smokeless tobacco: Never Used    Alcohol use No       No Known Allergies    Prior to Admission medications    Medication Sig Start Date End Date Taking? Authorizing Provider   aspirin delayed-release 81 mg tablet Take 81 mg by mouth daily. Yes Historical Provider   pravastatin (PRAVACHOL) 40 mg tablet Take 40 mg by mouth nightly. Yes Historical Provider   Cetirizine (ZYRTEC) 10 mg cap Take  by mouth daily. Yes Historical Provider   CHOLECALCIFEROL, VITAMIN D3, (VITAMIN D3 PO) Take  by mouth. Yes Historical Provider   multivitamin (ONE A DAY) tablet Take 1 tablet by mouth daily. Yes Historical Provider   carvedilol (COREG) 25 mg tablet Take 1 tablet by mouth two (2) times a day. 1/6/15  Yes Reese Stewart MD         Visit Vitals    /73    Pulse 67    Ht 5' 9\" (1.753 m)    Wt 83.5 kg (184 lb)    BMI 27.17 kg/m2         Physical Exam   Constitutional: He is oriented to person, place, and time. He appears well-developed and well-nourished. HENT:   Head: Normocephalic and atraumatic. Eyes: Conjunctivae are normal.   Neck: Neck supple. No JVD present. No tracheal deviation present. No thyromegaly present.    Cardiovascular: Normal rate, regular rhythm and normal heart sounds. Exam reveals no gallop and no friction rub. No murmur heard. Pulmonary/Chest: No respiratory distress. He has no wheezes. He has rales. He exhibits no tenderness. Abdominal: Soft. There is no tenderness. Musculoskeletal: He exhibits no edema. Neurological: He is alert and oriented to person, place, and time. Skin: Skin is warm and dry. Psychiatric: He has a normal mood and affect. Mr. Trell Duffy has a reminder for a \"due or due soon\" health maintenance. I have asked that he contact his primary care provider for follow-up on this health maintenance. [id number]  Left ventricle: Systolic function was normal by EF (biplane method of  disks). Ejection fraction was estimated in the range of 55 % to 60 %. No  obvious wall motion abnormalities identified in the views obtained. Wall  thickness was moderately to markedly increased. There was moderate  concentric hypertrophy. Right ventricle: Systolic pressure was mildly to moderately increased. Estimated peak pressure was in the range of 40 mmHg. Left atrium: The atrium was moderately dilated. Right atrium: The atrium was dilated. Mitral valve: There was mild regurgitation. Aortic valve: The valve was trileaflet. Pulmonic valve: There was mild regurgitation. Aorta, systemic arteries: The root exhibited mild dilatation. There was  mild dilatation of the ascending aorta.  ekg-2/2017  Sinus  Rhythm   WITHIN NORMAL LIMITS    SUMMARY:3/2017-echo  Left ventricle: Systolic function was normal. Ejection fraction was  estimated in the range of 60 % to 65 %. There were no regional wall motion  abnormalities. There was moderate concentric hypertrophy. Features were  consistent with a pseudonormal left ventricular filling pattern, with  concomitant abnormal relaxation and increased filling pressure (grade 2  diastolic dysfunction). Right ventricle: The ventricle was dilated.     Left atrium: The atrium was severely dilated. LA volume index was 73.44  ml/mï¾². Right atrium: The atrium was dilated. Mitral valve: There was mild annular calcification. There was mild  regurgitation. Tricuspid valve: There was mild regurgitation. Pulmonic valve: There was mild regurgitation. Aorta, systemic arteries: The root exhibited upper limit of normal size. Cath-9/2017-cath  HEMODYNAMICS: LVEDP is 16. PCW mean of 7. PA 35/10/16. RV  35/6 (EDP). RA mean is 3. Aortic pressure 160/70. No gradient at  aortic valve at the time of pullback. Cardiac output by Miguel method is 7.8 L/minute and by thermodilution is  5.6 L/minute. Cardiac index by Miguel is 3.99 and by thermodilution is 2.9. CONCLUSION:9/2017  1. Normal pulmonary artery pressure. 2. High normal left ventricular end-diastolic pressure without any aortic  valve gradient. 3. One-vessel coronary artery disease with 70% proximal and ostial  stenosis of second diagonal artery without any flow limitation  angiographically. 4. Normal left ventricular wall motion and ejection fraction in the range  of 60% to 65%. Assessment         ICD-10-CM ICD-9-CM    1. Coronary artery disease involving native coronary artery of native heart without angina pectoris I25.10 414.01     disease on small diag  medical managment   2. Essential hypertension I10 401.9     controlled   3. [facility] N18.6 585.6     on dialysis   4. Renal transplant recipient Z94.0 V42.0     ok for transplant from cardiac point   5. Hypercholesterolemia E78.00 272.0     continue statin  stable     10/2017  Ok for renal transplant from cardiac point  No orders of the defined types were placed in this encounter. Follow-up Disposition:  Return in about 6 months (around 4/30/2018).

## 2017-10-31 NOTE — MR AVS SNAPSHOT
Visit Information Date & Time Provider Department Dept. Phone Encounter #  
 10/31/2017 10:00 AM Suman Pierce MD [facility] [address] [place] [address] [id number] Follow-up Instructions Return in about 6 months (around 4/30/2018). Your Appointments 4/18/2018 10:00 AM  
ESTABLISHED PATIENT with Suman Pierce MD  
[facility]) Appt Note: 6 months [address] [facility], [address] [place] [address]  
[facility], [address] [place] [address] Upcoming Health Maintenance Date Due Hepatitis C Screening 1945 DTaP/Tdap/Td series (1 - Tdap) 7/7/1968 FOBT Q 1 YEAR AGE 50-75 7/7/1997 ZOSTER VACCINE AGE 60> 5/7/2007 GLAUCOMA SCREENING Q2Y 7/7/2012 Pneumococcal 65+ High/Highest Risk (1 of 2 - PCV13) 7/7/2012 MEDICARE YEARLY EXAM 7/7/2012 INFLUENZA AGE 9 TO ADULT 8/1/2017 Allergies as of 10/31/2017  Review Complete On: 10/31/2017 By: Suman Pierce MD  
 No Known Allergies Current Immunizations  Reviewed on 2/21/2017 No immunizations on file. Not reviewed this visit You Were Diagnosed With   
  
 Codes Comments Coronary artery disease involving native coronary artery of native heart without angina pectoris    -  Primary ICD-10-CM: I25.10 ICD-9-CM: 414.01 disease on small diag 
medical managment Essential hypertension     ICD-10-CM: I10 
ICD-9-CM: 401.9 controlled ESRD (end stage renal disease) ([facility] [address]     ICD-10-CM: N18.6 ICD-9-CM: 585.6 on dialysis Renal transplant recipient     ICD-10-CM: Z94.0 ICD-9-CM: V42.0 ok for transplant from cardiac point Hypercholesterolemia     ICD-10-CM: E78.00 ICD-9-CM: 272.0 continue statin 
stable Vitals BP Pulse Height(growth percentile) Weight(growth percentile) BMI Smoking Status 119/73 67 5' 9\" (1.753 m) 184 lb (83.5 kg) 27.17 kg/m2 Former Smoker Vitals History BMI and BSA Data Body Mass Index Body Surface Area  
 27.17 kg/m 2 2.02 m 2 Preferred Pharmacy Pharmacy Name Phone [facility] [address] [place], [address] Your Updated Medication List  
  
   
This list is accurate as of: 10/31/17 10:02 AM.  Always use your most recent med list.  
  
  
  
  
 aspirin delayed-release 81 mg tablet Take 81 mg by mouth daily. carvedilol 25 mg tablet Commonly known as:  Jinx Re Take 1 tablet by mouth two (2) times a day. multivitamin tablet Commonly known as:  ONE A DAY Take 1 tablet by mouth daily. PRAVACHOL 40 mg tablet Generic drug:  pravastatin Take 40 mg by mouth nightly. VITAMIN D3 PO Take  by mouth. ZyrTEC 10 mg Cap Generic drug:  Cetirizine Take  by mouth daily. Follow-up Instructions Return in about 6 months (around 4/30/2018). Introducing [facility]! Karlo Sanchez introduces [employer] patient portal. Now you can access parts of your medical record, email your doctor's office, and request medication refills online. 1. In your internet browser, go to https://Buy buy tea. G-Snap!/Buy buy tea 2. Click on the First Time User? Click Here link in the Sign In box. You will see the New Member Sign Up page. 3. Enter your [employer] Access Code exactly as it appears below. You will not need to use this code after youve completed the sign-up process. If you do not sign up before the expiration date, you must request a new code. · Great [employer] Access Code: [email or web address] Expires: 1/20/2018  8:45 AM 
 
4. Enter the last four digits of your Social Security Number (xxxx) and Date of Birth (mm/dd/yyyy) as indicated and click Submit. You will be taken to the next sign-up page. 5. Create a Great Dream ID. This will be your [employer] login ID and cannot be changed, so think of one that is secure and easy to remember. 6. Create a [employer] password. You can change your password at any time. 7. Enter your Password Reset Question and Answer. This can be used at a later time if you forget your password. 8. Enter your e-mail address. You will receive e-mail notification when new information is available in [address] 9. Click Sign Up. You can now view and download portions of your medical record. 10. Click the Download Summary menu link to download a portable copy of your medical information. If you have questions, please visit the Frequently Asked Questions section of the [employer] website. Remember, [employer] is NOT to be used for urgent needs. For medical emergencies, dial 911. Now available from your iPhone and Android! Please provide this summary of care documentation to your next provider. Your primary care clinician is listed as Roxanne English. If you have any questions after today's visit, please call [phone].

## 2017-10-31 NOTE — LETTER
[facility] 1945 
 
10/31/2017 Dear Peg Kahn MD 
 
I had the pleasure of evaluating  Mr. Trell Duffy in office today. Below are the relevant portions of my assessment and plan of care. ICD-10-CM ICD-9-CM 1. Coronary artery disease involving native coronary artery of native heart without angina pectoris I25.10 414.01   
 disease on small diag 
medical managment 2. Essential hypertension I10 401.9   
 controlled 3. ESRD (end stage renal disease) ([facility] [address] N18.6 585.6   
 on dialysis 4. Renal transplant recipient Z94.0 V42.0   
 ok for transplant from cardiac point 5. Hypercholesterolemia E78.00 272.0   
 continue statin 
stable Current Outpatient Prescriptions Medication Sig Dispense Refill  aspirin delayed-release 81 mg tablet Take 81 mg by mouth daily.  pravastatin (PRAVACHOL) 40 mg tablet Take 40 mg by mouth nightly.  Cetirizine (ZYRTEC) 10 mg cap Take  by mouth daily.  CHOLECALCIFEROL, VITAMIN D3, (VITAMIN D3 PO) Take  by mouth.  multivitamin (ONE A DAY) tablet Take 1 tablet by mouth daily.  carvedilol (COREG) 25 mg tablet Take 1 tablet by mouth two (2) times a day. 60 tablet 5 No orders of the defined types were placed in this encounter. If you have questions, please do not hesitate to call me. I look forward to following Mr. Trell Duffy along with you. Sincerely, Ana Luisa Jean MD

## 2017-10-31 NOTE — PROGRESS NOTES
1. Have you been to the ER, urgent care clinic since your last visit? Hospitalized since your last visit? Yes, Cath at [facility] 9/2017    2. Have you seen or consulted any other health care providers outside of the [address] [place] [address] since your last visit? Include any pap smears or colon screening.  Unsure

## 2018-01-15 ENCOUNTER — ANESTHESIA EVENT (OUTPATIENT)
Dept: ENDOSCOPY | Age: 73
End: 2018-01-15
Payer: MEDICARE

## 2018-01-16 ENCOUNTER — HOSPITAL ENCOUNTER (OUTPATIENT)
Age: 73
Setting detail: OUTPATIENT SURGERY
Discharge: HOME | End: 2018-01-16
Attending: INTERNAL MEDICINE | Admitting: INTERNAL MEDICINE
Payer: MEDICARE

## 2018-01-16 ENCOUNTER — ANESTHESIA (OUTPATIENT)
Dept: ENDOSCOPY | Age: 73
End: 2018-01-16
Payer: MEDICARE

## 2018-01-16 VITALS
OXYGEN SATURATION: 100 % | RESPIRATION RATE: 15 BRPM | SYSTOLIC BLOOD PRESSURE: 176 MMHG | TEMPERATURE: 97.1 F | WEIGHT: 191.4 LBS | BODY MASS INDEX: 28.35 KG/M2 | HEIGHT: 69 IN | DIASTOLIC BLOOD PRESSURE: 85 MMHG | HEART RATE: 62 BPM

## 2018-01-16 LAB
ANION GAP SERPL CALC-SCNC: 7 MMOL/L (ref 3–18)
BUN SERPL-MCNC: 25 MG/DL (ref 7–18)
BUN/CREAT SERPL: 3 (ref 12–20)
CALCIUM SERPL-MCNC: 7.8 MG/DL (ref 8.5–10.1)
CHLORIDE SERPL-SCNC: 102 MMOL/L (ref 100–108)
CO2 SERPL-SCNC: 29 MMOL/L (ref 21–32)
CREAT SERPL-MCNC: 7.44 MG/DL (ref 0.6–1.3)
GLUCOSE BLD STRIP.AUTO-MCNC: 89 MG/DL (ref 70–110)
GLUCOSE SERPL-MCNC: 82 MG/DL (ref 74–99)
POTASSIUM SERPL-SCNC: 4.7 MMOL/L (ref 3.5–5.5)
SODIUM SERPL-SCNC: 138 MMOL/L (ref 136–145)

## 2018-01-16 PROCEDURE — 76060000032 HC ANESTHESIA 0.5 TO 1 HR: Performed by: INTERNAL MEDICINE

## 2018-01-16 PROCEDURE — 74011000250 HC RX REV CODE- 250: Performed by: NURSE ANESTHETIST, CERTIFIED REGISTERED

## 2018-01-16 PROCEDURE — 77030013992 HC SNR POLYP ENDOSC BSC -B: Performed by: INTERNAL MEDICINE

## 2018-01-16 PROCEDURE — 74011000258 HC RX REV CODE- 258: Performed by: ANESTHESIOLOGY

## 2018-01-16 PROCEDURE — 74011250636 HC RX REV CODE- 250/636: Performed by: NURSE ANESTHETIST, CERTIFIED REGISTERED

## 2018-01-16 PROCEDURE — 77030008565 HC TBNG SUC IRR ERBE -B: Performed by: INTERNAL MEDICINE

## 2018-01-16 PROCEDURE — 77030018846 HC SOL IRR STRL H20 ICUM -A: Performed by: INTERNAL MEDICINE

## 2018-01-16 PROCEDURE — 74011000250 HC RX REV CODE- 250

## 2018-01-16 PROCEDURE — 82962 GLUCOSE BLOOD TEST: CPT

## 2018-01-16 PROCEDURE — 76040000007: Performed by: INTERNAL MEDICINE

## 2018-01-16 PROCEDURE — 77030009426 HC FCPS BIOP ENDOSC BSC -B: Performed by: INTERNAL MEDICINE

## 2018-01-16 PROCEDURE — 74011250636 HC RX REV CODE- 250/636

## 2018-01-16 PROCEDURE — 80048 BASIC METABOLIC PNL TOTAL CA: CPT | Performed by: INTERNAL MEDICINE

## 2018-01-16 RX ORDER — SODIUM CHLORIDE, SODIUM LACTATE, POTASSIUM CHLORIDE, CALCIUM CHLORIDE 600; 310; 30; 20 MG/100ML; MG/100ML; MG/100ML; MG/100ML
75 INJECTION, SOLUTION INTRAVENOUS CONTINUOUS
Status: DISCONTINUED | OUTPATIENT
Start: 2018-01-16 | End: 2018-01-16 | Stop reason: HOSPADM

## 2018-01-16 RX ORDER — SODIUM CHLORIDE 0.9 % (FLUSH) 0.9 %
5-10 SYRINGE (ML) INJECTION EVERY 8 HOURS
Status: DISCONTINUED | OUTPATIENT
Start: 2018-01-16 | End: 2018-01-16 | Stop reason: HOSPADM

## 2018-01-16 RX ORDER — SODIUM CHLORIDE 0.9 % (FLUSH) 0.9 %
5-10 SYRINGE (ML) INJECTION AS NEEDED
Status: CANCELLED | OUTPATIENT
Start: 2018-01-16

## 2018-01-16 RX ORDER — LIDOCAINE HYDROCHLORIDE 10 MG/ML
0.1 INJECTION, SOLUTION EPIDURAL; INFILTRATION; INTRACAUDAL; PERINEURAL AS NEEDED
Status: DISCONTINUED | OUTPATIENT
Start: 2018-01-16 | End: 2018-01-16 | Stop reason: HOSPADM

## 2018-01-16 RX ORDER — INSULIN LISPRO 100 [IU]/ML
INJECTION, SOLUTION INTRAVENOUS; SUBCUTANEOUS ONCE
Status: DISCONTINUED | OUTPATIENT
Start: 2018-01-16 | End: 2018-01-16 | Stop reason: HOSPADM

## 2018-01-16 RX ORDER — ONDANSETRON 2 MG/ML
4 INJECTION INTRAMUSCULAR; INTRAVENOUS ONCE
Status: CANCELLED | OUTPATIENT
Start: 2018-01-16 | End: 2018-01-16

## 2018-01-16 RX ORDER — SODIUM CHLORIDE 9 MG/ML
INJECTION, SOLUTION INTRAVENOUS
Status: DISCONTINUED | OUTPATIENT
Start: 2018-01-16 | End: 2018-01-16 | Stop reason: HOSPADM

## 2018-01-16 RX ORDER — SODIUM CHLORIDE 0.9 % (FLUSH) 0.9 %
5-10 SYRINGE (ML) INJECTION AS NEEDED
Status: DISCONTINUED | OUTPATIENT
Start: 2018-01-16 | End: 2018-01-16 | Stop reason: HOSPADM

## 2018-01-16 RX ORDER — DEXTROSE MONOHYDRATE AND SODIUM CHLORIDE 5; .225 G/100ML; G/100ML
25 INJECTION, SOLUTION INTRAVENOUS CONTINUOUS
Status: DISCONTINUED | OUTPATIENT
Start: 2018-01-16 | End: 2018-01-16 | Stop reason: HOSPADM

## 2018-01-16 RX ORDER — PROPOFOL 10 MG/ML
INJECTION, EMULSION INTRAVENOUS
Status: DISCONTINUED | OUTPATIENT
Start: 2018-01-16 | End: 2018-01-16 | Stop reason: HOSPADM

## 2018-01-16 RX ORDER — SODIUM CHLORIDE, SODIUM LACTATE, POTASSIUM CHLORIDE, CALCIUM CHLORIDE 600; 310; 30; 20 MG/100ML; MG/100ML; MG/100ML; MG/100ML
50 INJECTION, SOLUTION INTRAVENOUS CONTINUOUS
Status: CANCELLED | OUTPATIENT
Start: 2018-01-16

## 2018-01-16 RX ORDER — PROPOFOL 10 MG/ML
INJECTION, EMULSION INTRAVENOUS AS NEEDED
Status: DISCONTINUED | OUTPATIENT
Start: 2018-01-16 | End: 2018-01-16 | Stop reason: HOSPADM

## 2018-01-16 RX ORDER — LIDOCAINE HYDROCHLORIDE 20 MG/ML
INJECTION, SOLUTION EPIDURAL; INFILTRATION; INTRACAUDAL; PERINEURAL AS NEEDED
Status: DISCONTINUED | OUTPATIENT
Start: 2018-01-16 | End: 2018-01-16 | Stop reason: HOSPADM

## 2018-01-16 RX ADMIN — SODIUM CHLORIDE: 9 INJECTION, SOLUTION INTRAVENOUS at 09:30

## 2018-01-16 RX ADMIN — FAMOTIDINE 20 MG: 10 INJECTION INTRAVENOUS at 08:43

## 2018-01-16 RX ADMIN — LIDOCAINE HYDROCHLORIDE 20 MG: 20 INJECTION, SOLUTION EPIDURAL; INFILTRATION; INTRACAUDAL; PERINEURAL at 09:31

## 2018-01-16 RX ADMIN — PROPOFOL 10 MG: 10 INJECTION, EMULSION INTRAVENOUS at 09:37

## 2018-01-16 RX ADMIN — PROPOFOL 50 MG: 10 INJECTION, EMULSION INTRAVENOUS at 09:31

## 2018-01-16 RX ADMIN — PROPOFOL 20 MG: 10 INJECTION, EMULSION INTRAVENOUS at 09:42

## 2018-01-16 RX ADMIN — DEXTROSE MONOHYDRATE AND SODIUM CHLORIDE 25 ML/HR: 5; .225 INJECTION, SOLUTION INTRAVENOUS at 08:43

## 2018-01-16 RX ADMIN — PROPOFOL 20 MG: 10 INJECTION, EMULSION INTRAVENOUS at 09:34

## 2018-01-16 RX ADMIN — PROPOFOL 140 MCG/KG/MIN: 10 INJECTION, EMULSION INTRAVENOUS at 09:31

## 2018-01-16 RX ADMIN — PROPOFOL 20 MG: 10 INJECTION, EMULSION INTRAVENOUS at 09:33

## 2018-01-16 NOTE — ANESTHESIA POSTPROCEDURE EVALUATION
Post-Anesthesia Evaluation and Assessment    Patient: Tylor Barragan MRN: [id number]  SSN: [id number]    YOB: 1945  Age: 81 y.o. Sex: male       Cardiovascular Function/Vital Signs  Visit Vitals    /85 (BP 1 Location: Right arm, BP Patient Position: At rest)    Pulse 62    Temp 36.2 °C (97.1 °F)    Resp 15    Ht 5' 9\" (1.753 m)    Wt 86.8 kg (191 lb 6.4 oz)    SpO2 100%    BMI 28.26 kg/m2       Patient is status post MAC anesthesia for Procedure(s):  COLONOSCOPY. Nausea/Vomiting: None    Postoperative hydration reviewed and adequate. Pain:  Pain Scale 1: Numeric (0 - 10) (01/16/18 1024)  Pain Intensity 1: 0 (01/16/18 1024)   Managed    Neurological Status: At baseline    Mental Status and Level of Consciousness: Arousable    Pulmonary Status:   O2 Device: Room air (01/16/18 1024)   Adequate oxygenation and airway patent    Complications related to anesthesia: None    Post-anesthesia assessment completed.  No concerns    Signed By: Juan Carlos Hassan MD     January 16, 2018

## 2018-01-16 NOTE — IP AVS SNAPSHOT
Sandra Smith 
 
 
 [address] [facility] 61 [facility] Patient: [facility] ADAM PICKETT MRN: [id number] KFE:[id number][id number] About your hospitalization You were admitted on:  January 16, 2018 You last received care in the:  [facility] PHASE 2 RECOVERY You were discharged on:  January 16, 2018 Why you were hospitalized Your primary diagnosis was:  Not on File Follow-up Information Follow up With Details Comments Contact Info Kenneth Gant MD   [address] [place] [address] [address] [facility] [id number] 
[phone] MD Fina Sifuentes [address] 200 [facility] 
[phone] Discharge Orders None A check blanca indicates which time of day the medication should be taken. My Medications CONTINUE taking these medications Instructions Each Dose to Equal  
 Morning Noon Evening Bedtime  
 aspirin delayed-release 81 mg tablet Your last dose was: Your next dose is: Take 81 mg by mouth daily. 81 mg  
    
   
   
   
  
 carvedilol 25 mg tablet Commonly known as:  Dolores Chau Your last dose was: Your next dose is: Take 1 tablet by mouth two (2) times a day. 25 mg  
    
   
   
   
  
 multivitamin tablet Commonly known as:  ONE A DAY Your last dose was: Your next dose is: Take 1 tablet by mouth daily. 1 Tab PRAVACHOL 40 mg tablet Generic drug:  pravastatin Your last dose was: Your next dose is: Take 40 mg by mouth nightly. 40 mg  
    
   
   
   
  
 VITAMIN D3 PO Your last dose was: Your next dose is: Take  by mouth. ZyrTEC 10 mg Cap Generic drug:  Cetirizine Your last dose was: Your next dose is: Take  by mouth daily. Discharge Instructions Colonoscopy: What to Expect at [facility] Your Recovery After you have a colonoscopy, you will stay at the clinic for 1 to 2 hours until the medicines wear off. Then you can go home. But you will need to arrange for a ride. Your doctor will tell you when you can eat and do your other usual activities. Your doctor will talk to you about when you will need your next colonoscopy. Your doctor can help you decide how often you need to be checked. This will depend on the results of your test and your risk for colorectal cancer. After the test, you may be bloated or have gas pains. You may need to pass gas. If a biopsy was done or a polyp was removed, you may have streaks of blood in your stool (feces) for a few days. This care sheet gives you a general idea about how long it will take for you to recover. But each person recovers at a different pace. Follow the steps below to get better as quickly as possible. How can you care for yourself at home? Activity ? · Rest when you feel tired. ? · You can do your normal activities when it feels okay to do so. Diet ? · Follow your doctor's directions for eating. ? · Unless your doctor has told you not to, drink plenty of fluids. This helps to replace the fluids that were lost during the colon prep. ? · Do not drink alcohol. Medicines ? · Your doctor will tell you if and when you can restart your medicines. He or she will also give you instructions about taking any new medicines. ? · If you take blood thinners, such as warfarin (Coumadin), clopidogrel (Plavix), or aspirin, be sure to talk to your doctor. He or she will tell you if and when to start taking those medicines again. Make sure that you understand exactly what your doctor wants you to do. ? · If polyps were removed or a biopsy was done during the test, your doctor may tell you not to take aspirin or other anti-inflammatory medicines for a few days.  These include ibuprofen (Advil, Motrin) and naproxen (Aleve). Other instructions ? · For your safety, do not drive or operate machinery until the medicine wears off and you can think clearly. Your doctor may tell you not to drive or operate machinery until the day after your test.  
? · Do not sign legal documents or make major decisions until the medicine wears off and you can think clearly. The anesthesia can make it hard for you to fully understand what you are agreeing to. Follow-up care is a key part of your treatment and safety. Be sure to make and go to all appointments, and call your doctor if you are having problems. It's also a good idea to know your test results and keep a list of the medicines you take. When should you call for help? Call 911 anytime you think you may need emergency care. For example, call if: 
? · You passed out (lost consciousness). ? · You pass maroon or bloody stools. ? · You have trouble breathing. ?Call your doctor now or seek immediate medical care if: 
? · You have pain that does not get better after you take pain medicine. ? · You are sick to your stomach or cannot drink fluids. ? · You have new or worse belly pain. ? · You have blood in your stools. ? · You have a fever. ? · You cannot pass stools or gas. ? Watch closely for changes in your health, and be sure to contact your doctor if you have any problems. Where can you learn more? Go to http://estefany-chaitanya.info/. Enter E264 in the search box to learn more about \"Colonoscopy: What to Expect at Home. \" Current as of: May 12, 2017 Content Version: 11.4 © 8456-7714 Healthwise, Incorporated. Care instructions adapted under license by [facility] (which disclaims liability or warranty for this information). If you have questions about a medical condition or this instruction, always ask your healthcare professional. [address] any warranty or liability for your use of this information. Diverticulosis: Care Instructions Your Care Instructions In diverticulosis, pouches called diverticula form in the wall of the large intestine (colon). The pouches do not cause any pain or other symptoms. Most people who have diverticulosis do not know they have it. But the pouches sometimes bleed, and if they become infected, they can cause pain and other symptoms. When this happens, it is called diverticulitis. Diverticula form when pressure pushes the wall of the colon outward at certain weak points. A diet that is too low in fiber can cause diverticula. Follow-up care is a key part of your treatment and safety. Be sure to make and go to all appointments, and call your doctor if you are having problems. It's also a good idea to know your test results and keep a list of the medicines you take. How can you care for yourself at home? · Include fruits, leafy green vegetables, beans, and whole grains in your diet each day. These foods are high in fiber. · Take a fiber supplement, such as Citrucel or Metamucil, every day if needed. Read and follow all instructions on the label. · Drink plenty of fluids, enough so that your urine is light yellow or clear like water. If you have kidney, heart, or liver disease and have to limit fluids, talk with your doctor before you increase the amount of fluids you drink. · Get at least 30 minutes of exercise on most days of the week. Walking is a good choice. You also may want to do other activities, such as running, swimming, cycling, or playing tennis or team sports. · Cut out foods that cause gas, pain, or other symptoms. When should you call for help? Call your doctor now or seek immediate medical care if: 
? · You have belly pain. ? · You pass maroon or very bloody stools. ? · You have a fever. ? · You have nausea and vomiting. ? · You have unusual changes in your bowel movements or abdominal swelling. ? · You have burning pain when you urinate. ? · You have abnormal vaginal discharge. ? · You have shoulder pain. ? · You have cramping pain that does not get better when you have a bowel movement or pass gas. ? · You pass gas or stool from your urethra while urinating. ? Watch closely for changes in your health, and be sure to contact your doctor if you have any problems. Where can you learn more? Go to http://estefany-chaitanya.info/. Enter K046 in the search box to learn more about \"Diverticulosis: Care Instructions. \" Current as of: May 12, 2017 Content Version: 11.4 © 1371-1261 [employer]. Care instructions adapted under license by [employer] (which disclaims liability or warranty for this information). If you have questions about a medical condition or this instruction, always ask your healthcare professional. [address] any warranty or liability for your use of this information. Hemorrhoids: Care Instructions Your Care Instructions Hemorrhoids are enlarged veins that develop in the anal canal. Bleeding during bowel movements, itching, swelling, and rectal pain are the most common symptoms. They can be uncomfortable at times, but hemorrhoids rarely are a serious problem. You can treat most hemorrhoids with simple changes to your diet and bowel habits. These changes include eating more fiber and not straining to pass stools. Most hemorrhoids do not need surgery or other treatment unless they are very large and painful or bleed a lot. Follow-up care is a key part of your treatment and safety. Be sure to make and go to all appointments, and call your doctor if you are having problems. It's also a good idea to know your test results and keep a list of the medicines you take. How can you care for yourself at home? · Sit in a few inches of warm water (sitz bath) 3 times a day and after bowel movements. The warm water helps with pain and itching. · Put ice on your anal area several times a day for 10 minutes at a time. Put a thin cloth between the ice and your skin. Follow this by placing a warm, wet towel on the area for another 10 to 20 minutes. · Take pain medicines exactly as directed. ¨ If the doctor gave you a prescription medicine for pain, take it as prescribed. ¨ If you are not taking a prescription pain medicine, ask your doctor if you can take an over-the-counter medicine. · Keep the anal area clean, but be gentle. Use water and a fragrance-free soap, such as [place], or use baby wipes or medicated pads, such as Tucks. · Wear cotton underwear and loose clothing to decrease moisture in the anal area. · Eat more fiber. Include foods such as whole-grain breads and cereals, raw vegetables, raw and dried fruits, and beans. · Drink plenty of fluids, enough so that your urine is light yellow or clear like water. If you have kidney, heart, or liver disease and have to limit fluids, talk with your doctor before you increase the amount of fluids you drink. · Use a stool softener that contains bran or psyllium. You can save money by buying bran or psyllium (available in bulk at most health food stores) and sprinkling it on foods or stirring it into fruit juice. Or you can use a product such as Metamucil or Hydrocil. · Practice healthy bowel habits. ¨ Go to the bathroom as soon as you have the urge. ¨ Avoid straining to pass stools. Relax and give yourself time to let things happen naturally. ¨ Do not hold your breath while passing stools. ¨ Do not read while sitting on the toilet. Get off the toilet as soon as you have finished. · Take your medicines exactly as prescribed. Call your doctor if you think you are having a problem with your medicine. When should you call for help? Call 911 anytime you think you may need emergency care. For example, call if: 
? · You pass maroon or very bloody stools. ?Call your doctor now or seek immediate medical care if: 
? · You have increased pain. ? · You have increased bleeding. ? Watch closely for changes in your health, and be sure to contact your doctor if: 
? · Your symptoms have not improved after 3 or 4 days. Where can you learn more? Go to http://estefany-chaitanya.info/. Enter F228 in the search box to learn more about \"Hemorrhoids: Care Instructions. \" Current as of: May 12, 2017 Content Version: 11.4 © 1357-0632 [employer]. Care instructions adapted under license by [employer] (which disclaims liability or warranty for this information). If you have questions about a medical condition or this instruction, always ask your healthcare professional. [address] any warranty or liability for your use of this information. DISCHARGE SUMMARY from Nurse PATIENT INSTRUCTIONS: 
 
 
F-face looks uneven A-arms unable to move or move unevenly S-speech slurred or non-existent T-time-call 911 as soon as signs and symptoms begin-DO NOT go Back to bed or wait to see if you get better-TIME IS BRAIN. Warning Signs of HEART ATTACK Call 911 if you have these symptoms: 
? Chest discomfort. Most heart attacks involve discomfort in the center of the chest that lasts more than a few minutes, or that goes away and comes back. It can feel like uncomfortable pressure, squeezing, fullness, or pain. ? Discomfort in other areas of the upper body. Symptoms can include pain or discomfort in one or both arms, the back, neck, jaw, or stomach. ? Shortness of breath with or without chest discomfort. ? Other signs may include breaking out in a cold sweat, nausea, or lightheadedness. Don't wait more than five minutes to call [address]! Fast action can save your life.  Calling 911 is almost always the fastest way to get lifesaving treatment. Emergency Medical Services staff can begin treatment when they arrive  up to an hour sooner than if someone gets to the hospital by car. The discharge information has been reviewed with the patient. The patient verbalized understanding. Discharge medications reviewed with the patient and appropriate educational materials and side effects teaching were provided. ___________________________________________________________________________________________________________________________________ Introducing [address]! [facility] introduces [email or web address] patient portal. Now you can access parts of your medical record, email your doctor's office, and request medication refills online. 1. In your internet browser, go to [email or web address] 2. Click on the First Time User? Click Here link in the Sign In box. You will see the New Member Sign Up page. 3. Enter your [email or web address] Access Code exactly as it appears below. You will not need to use this code after youve completed the sign-up process. If you do not sign up before the expiration date, you must request a new code. · [email or web address] Access Code: [email or web address] Expires: 1/20/2018  7:45 AM 
 
4. Enter the last four digits of your Social Security Number (xxxx) and Date of Birth (mm/dd/yyyy) as indicated and click Submit. You will be taken to the next sign-up page. 5. Create a Democraviset ID. This will be your [email or web address] login ID and cannot be changed, so think of one that is secure and easy to remember. 6. Create a Democ[employer]iset password. You can change your password at any time. 7. Enter your Password Reset Question and Answer. This can be used at a later time if you forget your password. 8. Enter your e-mail address. You will receive e-mail notification when new information is available in [address] 9. Click Sign Up. You can now view and download portions of your medical record. 10. Click the Download Summary menu link to download a portable copy of your medical information. If you have questions, please visit the Frequently Asked Questions section of the [employer] website. Remember, [employer] is NOT to be used for urgent needs. For medical emergencies, dial 911. Now available from your iPhone and Android! Providers Seen During Your Hospitalization Provider Specialty Primary office phone Wai Roger MD Gastroenterology [phone] Your Primary Care Physician (PCP) Primary Care Physician Office Phone Office Fax Larissa Linton [phone] You are allergic to the following No active allergies Recent Documentation Height Weight BMI Smoking Status 1.753 m 86.8 kg 28.26 kg/m2 Former Smoker Emergency Contacts Name Discharge Info Relation Home Work Mobile [address] CAREGIVER [3] Daughter [21] [phone] Anamaria Hill  Son [22] [phone] Hui Salcedo  Child [2] [phone] Patient Belongings The following personal items are in your possession at time of discharge: 
  Dental Appliances: None  Visual Aid: None Please provide this summary of care documentation to your next provider. Signatures-by signing, you are acknowledging that this After Visit Summary has been reviewed with you and you have received a copy. Patient Signature:  ____________________________________________________________ Date:  ____________________________________________________________  
  
Jag Smith Provider Signature:  ____________________________________________________________ Date:  ____________________________________________________________

## 2018-01-16 NOTE — PROGRESS NOTES
[email or web address] [address]  [facility], [address]      Brief Procedure Note    General [address]  1945  [id number]    Date of Procedure: 1/16/2018    Preoperative diagnosis: Colon cancer screening [Z12.11]  End stage renal disease ([facility] [address] [N18.6]    Postoperative diagnosis: diverticulosis, hemorrhoids    Type of Anesthesia: MAC (Monitored anesthesia care)    Description of findings: same as post op dx    Procedure: Procedure(s):  COLONOSCOPY    :  Dr. Mireya Schroeder MD    Assistant(s): Endoscopy Technician-1: Jose Salvador  Endoscopy RN-1: Martin Kahn RN; Jack Rock RN    EBL:None    Specimens: * No specimens in log *    Findings: See printed and scanned procedure note    Complications: None    Dr. Mireya Schroeder MD  1/16/2018  9:58 AM

## 2018-01-16 NOTE — PERIOP NOTES
Patient armband removed and shredded    Patient confirmed by two identifiers with discharge instructions prior to being provided to patient and son/daughter.

## 2018-01-16 NOTE — ANESTHESIA PREPROCEDURE EVALUATION
Anesthetic History   No history of anesthetic complications            Review of Systems / Medical History  Patient summary reviewed, nursing notes reviewed and pertinent labs reviewed    Pulmonary        Sleep apnea: CPAP           Neuro/Psych   Within defined limits           Cardiovascular    Hypertension: well controlled          CAD    Exercise tolerance: >4 METS     GI/Hepatic/Renal  Within defined limits              Endo/Other  Within defined limits           Other Findings   Comments: Risk Factors for Postoperative nausea/vomiting:       History of postoperative nausea/vomiting? NO       Female? NO       Motion sickness? NO       Intended opioid administration for postoperative analgesia? NO      Smoking Abstinence  Current Smoker? NO  Elective Surgery? YES  Seen preoperatively by anesthesiologist or proxy prior to day of surgery? YES  Pt abstained from smoking 24 hours prior to anesthesia?  N/A           Physical Exam    Airway  Mallampati: II  TM Distance: 4 - 6 cm  Neck ROM: decreased range of motion   Mouth opening: Normal     Cardiovascular  Regular rate and rhythm,  S1 and S2 normal,  no murmur, click, rub, or gallop             Dental    Dentition: Poor dentition     Pulmonary  Breath sounds clear to auscultation               Abdominal  GI exam deferred       Other Findings            Anesthetic Plan    ASA: 2  Anesthesia type: MAC            Anesthetic plan and risks discussed with: Patient and Family

## 2018-01-16 NOTE — DISCHARGE INSTRUCTIONS
Colonoscopy: What to Expect at [address]  After you have a colonoscopy, you will stay at the clinic for 1 to 2 hours until the medicines wear off. Then you can go home. But you will need to arrange for a ride. Your doctor will tell you when you can eat and do your other usual activities. Your doctor will talk to you about when you will need your next colonoscopy. Your doctor can help you decide how often you need to be checked. This will depend on the results of your test and your risk for colorectal cancer. After the test, you may be bloated or have gas pains. You may need to pass gas. If a biopsy was done or a polyp was removed, you may have streaks of blood in your stool (feces) for a few days. This care sheet gives you a general idea about how long it will take for you to recover. But each person recovers at a different pace. Follow the steps below to get better as quickly as possible. How can you care for yourself at home? Activity  ? · Rest when you feel tired. ? · You can do your normal activities when it feels okay to do so. Diet  ? · Follow your doctor's directions for eating. ? · Unless your doctor has told you not to, drink plenty of fluids. This helps to replace the fluids that were lost during the colon prep. ? · Do not drink alcohol. Medicines  ? · Your doctor will tell you if and when you can restart your medicines. He or she will also give you instructions about taking any new medicines. ? · If you take blood thinners, such as warfarin (Coumadin), clopidogrel (Plavix), or aspirin, be sure to talk to your doctor. He or she will tell you if and when to start taking those medicines again. Make sure that you understand exactly what your doctor wants you to do. ? · If polyps were removed or a biopsy was done during the test, your doctor may tell you not to take aspirin or other anti-inflammatory medicines for a few days.  These include ibuprofen (Advil, Motrin) and naproxen ([place]). Other instructions  ? · For your safety, do not drive or operate machinery until the medicine wears off and you can think clearly. Your doctor may tell you not to drive or operate machinery until the day after your test.   ? · Do not sign legal documents or make major decisions until the medicine wears off and you can think clearly. The anesthesia can make it hard for you to fully understand what you are agreeing to. Follow-up care is a key part of your treatment and safety. Be sure to make and go to all appointments, and call your doctor if you are having problems. It's also a good idea to know your test results and keep a list of the medicines you take. When should you call for help? Call 911 anytime you think you may need emergency care. For example, call if:  ? · You passed out (lost consciousness). ? · You pass maroon or bloody stools. ? · You have trouble breathing. ?Call your doctor now or seek immediate medical care if:  ? · You have pain that does not get better after you take pain medicine. ? · You are sick to your stomach or cannot drink fluids. ? · You have new or worse belly pain. ? · You have blood in your stools. ? · You have a fever. ? · You cannot pass stools or gas. ? Watch closely for changes in your health, and be sure to contact your doctor if you have any problems. Where can you learn more? Go to http://estefany-chaitanya.info/. Enter E264 in the search box to learn more about \"Colonoscopy: What to Expect at Home. \"  Current as of: May 12, 2017  Content Version: 11.4  © 2534-1446 [employer]. Care instructions adapted under license by [employer] (which disclaims liability or warranty for this information). If you have questions about a medical condition or this instruction, always ask your healthcare professional. [address] any warranty or liability for your use of this information.   Diverticulosis: Care Instructions  Your Care Instructions  In diverticulosis, pouches called diverticula form in the wall of the large intestine (colon). The pouches do not cause any pain or other symptoms. Most people who have diverticulosis do not know they have it. But the pouches sometimes bleed, and if they become infected, they can cause pain and other symptoms. When this happens, it is called diverticulitis. Diverticula form when pressure pushes the wall of the colon outward at certain weak points. A diet that is too low in fiber can cause diverticula. Follow-up care is a key part of your treatment and safety. Be sure to make and go to all appointments, and call your doctor if you are having problems. It's also a good idea to know your test results and keep a list of the medicines you take. How can you care for yourself at home? · Include fruits, leafy green vegetables, beans, and whole grains in your diet each day. These foods are high in fiber. · Take a fiber supplement, such as Citrucel or Metamucil, every day if needed. Read and follow all instructions on the label. · Drink plenty of fluids, enough so that your urine is light yellow or clear like water. If you have kidney, heart, or liver disease and have to limit fluids, talk with your doctor before you increase the amount of fluids you drink. · Get at least 30 minutes of exercise on most days of the week. Walking is a good choice. You also may want to do other activities, such as running, swimming, cycling, or playing tennis or team sports. · Cut out foods that cause gas, pain, or other symptoms. When should you call for help? Call your doctor now or seek immediate medical care if:  ? · You have belly pain. ? · You pass maroon or very bloody stools. ? · You have a fever. ? · You have nausea and vomiting. ? · You have unusual changes in your bowel movements or abdominal swelling. ? · You have burning pain when you urinate.    ? · You have abnormal vaginal discharge. ? · You have shoulder pain. ? · You have cramping pain that does not get better when you have a bowel movement or pass gas. ? · You pass gas or stool from your urethra while urinating. ? Watch closely for changes in your health, and be sure to contact your doctor if you have any problems. Where can you learn more? Go to http://estefany-chaitanya.info/. Enter N354 in the search box to learn more about \"Diverticulosis: Care Instructions. \"  Current as of: May 12, 2017  Content Version: 11.4  © 3558-0015 [email or web address]. Care instructions adapted under license by [employer] (which disclaims liability or warranty for this information). If you have questions about a medical condition or this instruction, always ask your healthcare professional. [address] any warranty or liability for your use of this information. Hemorrhoids: Care Instructions  Your Care Instructions    Hemorrhoids are enlarged veins that develop in the anal canal. Bleeding during bowel movements, itching, swelling, and rectal pain are the most common symptoms. They can be uncomfortable at times, but hemorrhoids rarely are a serious problem. You can treat most hemorrhoids with simple changes to your diet and bowel habits. These changes include eating more fiber and not straining to pass stools. Most hemorrhoids do not need surgery or other treatment unless they are very large and painful or bleed a lot. Follow-up care is a key part of your treatment and safety. Be sure to make and go to all appointments, and call your doctor if you are having problems. It's also a good idea to know your test results and keep a list of the medicines you take. How can you care for yourself at home? · Sit in a few inches of warm water (sitz bath) 3 times a day and after bowel movements. The warm water helps with pain and itching.   · Put ice on your anal area several times a day for 10 minutes at a time. Put a thin cloth between the ice and your skin. Follow this by placing a warm, wet towel on the area for another 10 to 20 minutes. · Take pain medicines exactly as directed. ¨ If the doctor gave you a prescription medicine for pain, take it as prescribed. ¨ If you are not taking a prescription pain medicine, ask your doctor if you can take an over-the-counter medicine. · Keep the anal area clean, but be gentle. Use water and a fragrance-free soap, such as [place], or use baby wipes or medicated pads, such as Tucks. · Wear cotton underwear and loose clothing to decrease moisture in the anal area. · Eat more fiber. Include foods such as whole-grain breads and cereals, raw vegetables, raw and dried fruits, and beans. · Drink plenty of fluids, enough so that your urine is light yellow or clear like water. If you have kidney, heart, or liver disease and have to limit fluids, talk with your doctor before you increase the amount of fluids you drink. · Use a stool softener that contains bran or psyllium. You can save money by buying bran or psyllium (available in bulk at most health food stores) and sprinkling it on foods or stirring it into fruit juice. Or you can use a product such as Metamucil or Hydrocil. · Practice healthy bowel habits. ¨ Go to the bathroom as soon as you have the urge. ¨ Avoid straining to pass stools. Relax and give yourself time to let things happen naturally. ¨ Do not hold your breath while passing stools. ¨ Do not read while sitting on the toilet. Get off the toilet as soon as you have finished. · Take your medicines exactly as prescribed. Call your doctor if you think you are having a problem with your medicine. When should you call for help? Call 911 anytime you think you may need emergency care. For example, call if:  ? · You pass maroon or very bloody stools. ?Call your doctor now or seek immediate medical care if:  ? · You have increased pain.    ? · You have increased bleeding. ? Watch closely for changes in your health, and be sure to contact your doctor if:  ? · Your symptoms have not improved after 3 or 4 days. Where can you learn more? Go to http://estefany-chaitanya.info/. Enter F228 in the search box to learn more about \"Hemorrhoids: Care Instructions. \"  Current as of: May 12, 2017  Content Version: 11.4  © 2906-1922 [employer]. Care instructions adapted under license by [employer] (which disclaims liability or warranty for this information). If you have questions about a medical condition or this instruction, always ask your healthcare professional. [address] any warranty or liability for your use of this information. DISCHARGE SUMMARY from Nurse    PATIENT INSTRUCTIONS:    After general anesthesia or intravenous sedation, for 24 hours or while taking prescription Narcotics:  · Limit your activities  · Do not drive and operate hazardous machinery  · Do not make important personal or business decisions  · Do  not drink alcoholic beverages  · If you have not urinated within 8 hours after discharge, please contact your surgeon on call. Report the following to your surgeon:  · Excessive pain, swelling, redness or odor of or around the surgical area  · Temperature over 100.5  · Nausea and vomiting lasting longer than 4 hours or if unable to take medications  · Any signs of decreased circulation or nerve impairment to extremity: change in color, persistent  numbness, tingling, coldness or increase pain  · Any questions    *  Please give a list of your current medications to your Primary Care Provider. *  Please update this list whenever your medications are discontinued, doses are      changed, or new medications (including over-the-counter products) are added. *  Please carry medication information at all times in case of emergency situations.     These are general instructions for a healthy lifestyle:    No smoking/ No tobacco products/ Avoid exposure to second hand smoke  Surgeon General's Warning:  Quitting smoking now greatly reduces serious risk to your health. Obesity, smoking, and sedentary lifestyle greatly increases your risk for illness    A healthy diet, regular physical exercise & weight monitoring are important for maintaining a healthy lifestyle    You may be retaining fluid if you have a history of heart failure or if you experience any of the following symptoms:  Weight gain of 3 pounds or more overnight or 5 pounds in a week, increased swelling in our hands or feet or shortness of breath while lying flat in bed. Please call your doctor as soon as you notice any of these symptoms; do not wait until your next office visit. Recognize signs and symptoms of STROKE:    F-face looks uneven    A-arms unable to move or move unevenly    S-speech slurred or non-existent    T-time-call 911 as soon as signs and symptoms begin-DO NOT go       Back to bed or wait to see if you get better-TIME IS BRAIN. Warning Signs of HEART ATTACK     Call 911 if you have these symptoms:   Chest discomfort. Most heart attacks involve discomfort in the center of the chest that lasts more than a few minutes, or that goes away and comes back. It can feel like uncomfortable pressure, squeezing, fullness, or pain.  Discomfort in other areas of the upper body. Symptoms can include pain or discomfort in one or both arms, the back, neck, jaw, or stomach.  Shortness of breath with or without chest discomfort.  Other signs may include breaking out in a cold sweat, nausea, or lightheadedness. Don't wait more than five minutes to call 911 - MINUTES MATTER! Fast action can save your life. Calling 911 is almost always the fastest way to get lifesaving treatment. Emergency Medical Services staff can begin treatment when they arrive -- up to an hour sooner than if someone gets to the hospital by car. The discharge information has been reviewed with the patient. The patient verbalized understanding. Discharge medications reviewed with the patient and appropriate educational materials and side effects teaching were provided.   ___________________________________________________________________________________________________________________________________

## 2018-01-16 NOTE — H&P
[email or web address]  [phone]      Impression:   1. Average risk colon cancer screening exam      Plan:     1. Colonoscopy      Chief Complaint: Average risk colon cancer screening exam.      HPI:  Killina Rowley is a 81 y.o. male who is being seen on consult for average risk colon cancer screening with colonoscopy    PMH:   Past Medical History:   Diagnosis Date    Anemia in chronic kidney disease     Chronic kidney disease     History of kidney transplant x3    Chronic kidney disease     HD- M-W-F    History of echocardiogram 01/08/2015    EF 55-60%. No WMA. Mod conc LVH. RVSP 40  mmHg. Mod LAE.  JCARLOS. Mild MR. Mild PI. Mild AoRE. Mild ascend'g Ao dilatation.  HTN (hypertension)     Hypercholesterolemia     Sleep apnea     CPAP       PSH:   Past Surgical History:   Procedure Laterality Date    HX PARTIAL THYROIDECTOMY      HX RENAL TRANSPLANT      X3    HX SPLENECTOMY      VASCULAR SURGERY PROCEDURE UNLIST      LEFT ARM FISTULA       Social HX:   Social History     Social History    Marital status: SINGLE     Spouse name: N/A    Number of children: N/A    Years of education: N/A     Occupational History    Not on file. Social History Main Topics    Smoking status: Former Smoker     Packs/day: 0.50     Years: 10.00     Quit date: 1/6/1975    Smokeless tobacco: Never Used    Alcohol use No    Drug use: No    Sexual activity: Not on file     Other Topics Concern    Not on file     Social History Narrative       FHX:   Family History   Problem Relation Age of Onset    Hypertension Sister     Hypertension Brother     Hypertension Father        Allergy:   No Known Allergies    Home Medications:     Prescriptions Prior to Admission   Medication Sig    aspirin delayed-release 81 mg tablet Take 81 mg by mouth daily.  pravastatin (PRAVACHOL) 40 mg tablet Take 40 mg by mouth nightly.  Cetirizine (ZYRTEC) 10 mg cap Take  by mouth daily.     CHOLECALCIFEROL, VITAMIN D3, (VITAMIN D3 PO) Take  by mouth.  multivitamin (ONE A DAY) tablet Take 1 tablet by mouth daily.  carvedilol (COREG) 25 mg tablet Take 1 tablet by mouth two (2) times a day. Review of Systems:     Constitutional: No fevers, chills, weight loss, fatigue. Skin: No rashes, pruritis, jaundice, ulcerations, erythema. HENT: No headaches, nosebleeds, sinus pressure, rhinorrhea, sore throat. Eyes: No visual changes, blurred vision, eye pain, photophobia, jaundice. Cardiovascular: No chest pain, heart palpitations. Respiratory: No cough, SOB, wheezing, chest discomfort, orthopnea. Gastrointestinal:    Genitourinary: No dysuria, bleeding, discharge, pyuria. Musculoskeletal: No weakness, arthralgias, wasting. Endo: No sweats. Heme: No bruising, easy bleeding. Allergies: As noted. Neurological: Cranial nerves intact. Alert and oriented. Gait not assessed. Psychiatric:  No anxiety, depression, hallucinations. Visit Vitals    /89    Pulse 70    Temp 98 °F (36.7 °C)    Ht 5' 9\" (1.753 m)    Wt 86.8 kg (191 lb 6.4 oz)    SpO2 99%    BMI 28.26 kg/m2       Physical Assessment:     constitutional: appearance: well developed, well nourished, normal habitus, no deformities, in no acute distress. skin: inspection: no rashes, ulcers, icterus or other lesions; no clubbing or telangiectasias. palpation: no induration or subcutaneos nodules. eyes: inspection: normal conjunctivae and lids; no jaundice pupils: symmetrical, normoreactive to light, normal accommodation and size. ENMT: mouth: normal oral mucosa,lips and gums; good dentition. oropharynx: normal tongue, hard and soft palate; posterior pharynx without erithema, exudate or lesions. neck: thyroid: normal size, consistency and position; no masses or tenderness. respiratory: effort: normal chest excursion; no intercostal retraction or accessory muscle use.    cardiovascular: abdominal aorta: normal size and position; no bruits. palpation: PMI of normal size and position; normal rhythm; no thrill or murmurs. abdominal: abdomen: normal consistency; no tenderness or masses. hernias: no hernias appreciated. liver: normal size and consistency. spleen: not palpable. rectal: hemoccult/guaiac: not performed. musculoskeletal: digits and nails: no clubbing, cyanosis, petechiae or other inflammatory conditions. gait: normal gait and station head and neck: normal range of motion; no pain, crepitation or contracture. spine/ribs/pelvis: normal range of motion; no pain, deformity or contracture. lymphatic: axilae: not palpable. groin: not palpable. neck: within normal limits. other: not palpable. neurologic: cranial nerves: II-XII normal.   psychiatric: judgement/insight: within normal limits. memory: within normal limits for recent and remote events. mood and affect: no evidence of depression, anxiety or agitation. orientation: oriented to time, space and person. Basic Metabolic Profile   No results for input(s): NA, K, CL, CO2, BUN, GLU, CA, MG, PHOS in the last 72 hours. No lab exists for component: CREAT      CBC w/Diff    No results for input(s): WBC, RBC, HGB, HCT, MCV, MCH, MCHC, RDW, PLT, HGBEXT, HCTEXT, PLTEXT in the last 72 hours. No lab exists for component: MPV No results for input(s): GRANS, LYMPH, EOS, PRO, MYELO, METAS, BLAST in the last 72 hours. No lab exists for component: MONO, BASO     Hepatic Function   No results for input(s): ALB, TP, TBILI, GPT, SGOT, AP, AML, LPSE in the last 72 hours. No lab exists for component: Damon Gore MD, MLloydD. [facility], [address] [place] [address]  [email or web address] [facility]

## 2018-05-17 ENCOUNTER — OFFICE VISIT (OUTPATIENT)
Dept: CARDIOLOGY CLINIC | Age: 73
End: 2018-05-17

## 2018-05-17 VITALS
HEIGHT: 69 IN | HEART RATE: 73 BPM | WEIGHT: 188 LBS | BODY MASS INDEX: 27.85 KG/M2 | DIASTOLIC BLOOD PRESSURE: 67 MMHG | SYSTOLIC BLOOD PRESSURE: 117 MMHG

## 2018-05-17 DIAGNOSIS — E78.00 HYPERCHOLESTEROLEMIA: ICD-10-CM

## 2018-05-17 DIAGNOSIS — I25.10 CORONARY ARTERY DISEASE INVOLVING NATIVE CORONARY ARTERY OF NATIVE HEART WITHOUT ANGINA PECTORIS: Primary | ICD-10-CM

## 2018-05-17 DIAGNOSIS — N18.6 ESRD (END STAGE RENAL DISEASE) (HCC): ICD-10-CM

## 2018-05-17 DIAGNOSIS — R06.02 SOB (SHORTNESS OF BREATH): ICD-10-CM

## 2018-05-17 DIAGNOSIS — I10 ESSENTIAL HYPERTENSION: ICD-10-CM

## 2018-05-17 RX ORDER — CLONIDINE HYDROCHLORIDE 0.1 MG/1
TABLET ORAL 2 TIMES DAILY
COMMUNITY
End: 2020-11-12

## 2018-05-17 NOTE — LETTER
General Rachna Walsh 1945 5/17/2018 Dear Jessica Prescott MD 
 
I had the pleasure of evaluating  Mr. Rachna Walsh in office today. Below are the relevant portions of my assessment and plan of care. ICD-10-CM ICD-9-CM 1. Coronary artery disease involving native coronary artery of native heart without angina pectoris I25.10 414.01 2D ECHO COMPLETE ADULT (TTE) stable 2. Essential hypertension I10 401.9   
 controlled 3. Hypercholesterolemia E78.00 272.0   
 stable 
on statin 4. ESRD (end stage renal disease) ([address] N18.6 585.6 5. SOB (shortness of breath) R06.02 786.05 2D ECHO COMPLETE ADULT (TTE)  
 worse after dialysis ? bp changes Current Outpatient Prescriptions Medication Sig Dispense Refill  cloNIDine HCl (CATAPRES) 0.1 mg tablet Take  by mouth two (2) times a day.  sucroferric oxyhydroxide (VELPHORO) 500 mg chew chewable tablet Take  by mouth three (3) times daily (with meals).  aspirin delayed-release 81 mg tablet Take 81 mg by mouth daily.  pravastatin (PRAVACHOL) 40 mg tablet Take 40 mg by mouth nightly.  Cetirizine (ZYRTEC) 10 mg cap Take  by mouth daily.  CHOLECALCIFEROL, VITAMIN D3, (VITAMIN D3 PO) Take  by mouth.  multivitamin (ONE A DAY) tablet Take 1 tablet by mouth daily.  carvedilol (COREG) 25 mg tablet Take 1 tablet by mouth two (2) times a day. 60 tablet 5 Orders Placed This Encounter  2D ECHO COMPLETE ADULT (TTE) Standing Status:   Future Standing Expiration Date:   11/13/2018 Order Specific Question:   Reason for Exam: Answer:   see diagnosis  cloNIDine HCl (CATAPRES) 0.1 mg tablet Sig: Take  by mouth two (2) times a day.  sucroferric oxyhydroxide (VELPHORO) 500 mg chew chewable tablet Sig: Take  by mouth three (3) times daily (with meals). If you have questions, please do not hesitate to call me. I look forward to following Mr. Rachna Walsh along with you.  
 
Sincerely, 
 Tori Hook MD

## 2018-05-17 NOTE — PROGRESS NOTES
HISTORY OF PRESENT ILLNESS  General 1981 Latrell Young is a 81 y.o. male. Hypertension   The history is provided by the patient. This is a chronic problem. The problem occurs constantly. The problem has not changed since onset. Pertinent negatives include no chest pain, no abdominal pain, no headaches and no shortness of breath. Nothing aggravates the symptoms. Cholesterol Problem   The history is provided by the patient. This is a chronic problem. The problem occurs constantly. Pertinent negatives include no chest pain, no abdominal pain, no headaches and no shortness of breath. Shortness of Breath   The history is provided by the patient. This is a recurrent problem. The problem occurs frequently. The problem has been gradually worsening. Pertinent negatives include no fever, no headaches, no cough, no sputum production, no hemoptysis, no wheezing, no PND, no orthopnea, no chest pain, no vomiting, no abdominal pain, no rash, no leg swelling and no claudication. Precipitated by: exertion,dialysis. Dizziness   The history is provided by the patient. This is a recurrent problem. The problem occurs rarely. Pertinent negatives include no chest pain, no abdominal pain, no headaches and no shortness of breath. Review of Systems   Constitutional: Negative for chills and fever. HENT: Negative for nosebleeds. Eyes: Negative for blurred vision and double vision. Respiratory: Negative for cough, hemoptysis, sputum production, shortness of breath and wheezing. Cardiovascular: Negative for chest pain, palpitations, orthopnea, claudication, leg swelling and PND. Gastrointestinal: Negative for abdominal pain, heartburn, nausea and vomiting. Musculoskeletal: Negative for myalgias. Skin: Negative for rash. Neurological: Positive for dizziness. Negative for weakness and headaches. Endo/Heme/Allergies: Does not bruise/bleed easily.      Family History   Problem Relation Age of Onset    Hypertension Sister  Hypertension Brother     Hypertension Father        Past Medical History:   Diagnosis Date    Anemia in chronic kidney disease     Chronic kidney disease     History of kidney transplant x3    Chronic kidney disease     HD- M-W-F    History of echocardiogram 01/08/2015    EF 55-60%. No WMA. Mod conc LVH. RVSP 40  mmHg. Mod LAE.  JCARLOS. Mild MR. Mild PI. Mild AoRE. Mild ascend'g Ao dilatation.  HTN (hypertension)     Hypercholesterolemia     Sleep apnea     CPAP       Past Surgical History:   Procedure Laterality Date    COLONOSCOPY N/A 1/16/2018    COLONOSCOPY performed by Brandon Vizcarra MD at [facility] ENDOSCOPY    HX PARTIAL THYROIDECTOMY      HX RENAL TRANSPLANT      X3    HX SPLENECTOMY      VASCULAR SURGERY PROCEDURE UNLIST      LEFT ARM FISTULA       Social History   Substance Use Topics    Smoking status: Former Smoker     Packs/day: 0.50     Years: 10.00     Quit date: 1/6/1975    Smokeless tobacco: Never Used    Alcohol use No       No Known Allergies    Prior to Admission medications    Medication Sig Start Date End Date Taking? Authorizing Provider   cloNIDine HCl (CATAPRES) 0.1 mg tablet Take  by mouth two (2) times a day. Yes Historical Provider   sucroferric oxyhydroxide (VELPHORO) 500 mg chew chewable tablet Take  by mouth three (3) times daily (with meals). Yes Historical Provider   aspirin delayed-release 81 mg tablet Take 81 mg by mouth daily. Yes Historical Provider   pravastatin (PRAVACHOL) 40 mg tablet Take 40 mg by mouth nightly. Yes Historical Provider   Cetirizine (ZYRTEC) 10 mg cap Take  by mouth daily. Yes Historical Provider   CHOLECALCIFEROL, VITAMIN D3, (VITAMIN D3 PO) Take  by mouth. Yes Historical Provider   multivitamin (ONE A DAY) tablet Take 1 tablet by mouth daily. Yes Historical Provider   carvedilol (COREG) 25 mg tablet Take 1 tablet by mouth two (2) times a day.  1/6/15  Yes Emily Morrison MD         Visit Vitals    /67    Pulse 73    Ht 5' 9\" (1.753 m)    Wt 85.3 kg (188 lb)    BMI 27.76 kg/m2         Physical Exam   Constitutional: He is oriented to person, place, and time. He appears well-developed and well-nourished. HENT:   Head: Normocephalic and atraumatic. Eyes: Conjunctivae are normal.   Neck: Neck supple. No JVD present. No tracheal deviation present. No thyromegaly present. Cardiovascular: Normal rate, regular rhythm and normal heart sounds. Exam reveals no gallop and no friction rub. No murmur heard. Pulmonary/Chest: No respiratory distress. He has no wheezes. He has rales. He exhibits no tenderness. Abdominal: Soft. There is no tenderness. Musculoskeletal: He exhibits no edema. Neurological: He is alert and oriented to person, place, and time. Skin: Skin is warm and dry. Psychiatric: He has a normal mood and affect. Mr. Eliza Sanchez has a reminder for a \"due or due soon\" health maintenance. I have asked that he contact his primary care provider for follow-up on this health maintenance. [facility] DanvilleGF[facility][id number]  Left ventricle: Systolic function was normal by EF (biplane method of  disks). Ejection fraction was estimated in the range of 55 % to 60 %. No  obvious wall motion abnormalities identified in the views obtained. Wall  thickness was moderately to markedly increased. There was moderate  concentric hypertrophy. Right ventricle: Systolic pressure was mildly to moderately increased. Estimated peak pressure was in the range of 40 mmHg. Left atrium: The atrium was moderately dilated. Right atrium: The atrium was dilated. Mitral valve: There was mild regurgitation. Aortic valve: The valve was trileaflet. Pulmonic valve: There was mild regurgitation. Aorta, systemic arteries: The root exhibited mild dilatation.  There was  mild dilatation of the ascending aorta.  ekg-2/2017  Sinus  Rhythm   WITHIN NORMAL LIMITS    SUMMARY:3/2017-echo  Left ventricle: Systolic function was normal. Ejection fraction was  estimated in the range of 60 % to 65 %. There were no regional wall motion  abnormalities. There was moderate concentric hypertrophy. Features were  consistent with a pseudonormal left ventricular filling pattern, with  concomitant abnormal relaxation and increased filling pressure (grade 2  diastolic dysfunction). Right ventricle: The ventricle was dilated. Left atrium: The atrium was severely dilated. LA volume index was 73.44  ml/mï¾². Right atrium: The atrium was dilated. Mitral valve: There was mild annular calcification. There was mild  regurgitation. Tricuspid valve: There was mild regurgitation. Pulmonic valve: There was mild regurgitation. Aorta, systemic arteries: The root exhibited upper limit of normal size. Cath-9/2017-cath  HEMODYNAMICS: LVEDP is 16. PCW mean of 7. PA 35/10/16. RV  35/6 (EDP). RA mean is 3. Aortic pressure 160/70. No gradient at  aortic valve at the time of pullback. Cardiac output by Miguel method is 7.8 L/minute and by thermodilution is  5.6 L/minute. Cardiac index by Miguel is 3.99 and by thermodilution is 2.9. CONCLUSION:9/2017  1. Normal pulmonary artery pressure. 2. High normal left ventricular end-diastolic pressure without any aortic  valve gradient. 3. One-vessel coronary artery disease with 70% proximal and ostial  stenosis of second diagonal artery without any flow limitation  angiographically. 4. Normal left ventricular wall motion and ejection fraction in the range  of 60% to 65%. Assessment         ICD-10-CM ICD-9-CM    1. Coronary artery disease involving native coronary artery of native heart without angina pectoris I25.10 414.01 2D ECHO COMPLETE ADULT (TTE)    stable   2. Essential hypertension I10 401.9     controlled   3. Hypercholesterolemia E78.00 272.0     stable  on statin   4. ESRD (end stage renal disease) ([facility] [address] N18.6 585.6    5. SOB (shortness of breath) R06.02 786.05 2D ECHO COMPLETE ADULT (TTE)    worse after dialysis  ? bp changes     10/2017  Ok for renal transplant from cardiac point  Orders Placed This Encounter    2D ECHO COMPLETE ADULT (TTE)     Standing Status:   Future     Standing Expiration Date:   11/13/2018     Order Specific Question:   Reason for Exam:     Answer:   see diagnosis       Follow-up Disposition:  Return in about 2 months (around 7/17/2018).

## 2018-05-17 NOTE — PROGRESS NOTES
Patient brought medications list    1. Have you been to the ER, urgent care clinic since your last visit? Hospitalized since your last visit? No    2. Have you seen or consulted any other health care providers outside of the [address] [place] [address] since your last visit? Include any pap smears or colon screening.  No

## 2018-05-17 NOTE — MR AVS SNAPSHOT
[address] [facility] [address] 
 
 
 [address], [address] [facility] [address] 
[phone] Patient: [facility] ADAM PICKETT MRN: [id number] Visit Information Date & Time Provider Department Dept. Phone Encounter #  
 2018  9:00 AM Mario Alberto Nichole MD [facility] [address] [place] [address] [id number] Follow-up Instructions Return in about 2 months (around 2018). Your Appointments 2018  9:00 AM  
Office Visit with Mario Alberto Nichole MD  
[facility]) Appt Note: 6 months; 6 months [address] [facility], [address] [facility] [address]  
[address], [address] [place] [address] 2018  1:30 PM  
PROCEDURE with [place] ECHO [facility] ([address]) Appt Note: kumar [address], [address] [facility] [id number] [phone]  
  
   
 [address] [facility], [address] [place] [address]  
  
    
 2018  8:30 AM  
Office Visit with Mario Alberto Nichole MD  
[facility]) Appt Note: 2 months post echo [address] [facility] [address] [facility] [address] [phone] Upcoming Health Maintenance Date Due Hepatitis C Screening 1945 DTaP/Tdap/Td series (1 - Tdap) 1968 FOBT Q 1 YEAR AGE 50-75 1997 ZOSTER VACCINE AGE 60> 2007 GLAUCOMA SCREENING Q2Y 2012 Pneumococcal 65+ High/Highest Risk (1 of 2 - PCV13) 2012 MEDICARE YEARLY EXAM 3/14/2018 Influenza Age 5 to Adult 2018 Allergies as of 2018  Review Complete On: 2018 By: Sara Philippe No Known Allergies Current Immunizations  Reviewed on 2017 No immunizations on file. Not reviewed this visit You Were Diagnosed With   
  
 Codes Comments  Coronary artery disease involving native coronary artery of native heart without angina pectoris    -  Primary ICD-10-CM: I25.10 ICD-9-CM: 414.01 stable Essential hypertension     ICD-10-CM: I10 
ICD-9-CM: 401.9 controlled Hypercholesterolemia     ICD-10-CM: E78.00 ICD-9-CM: 272.0 stable 
on statin ESRD (end stage renal disease) ([facility] [address]     ICD-10-CM: N18.6 ICD-9-CM: 585.6 SOB (shortness of breath)     ICD-10-CM: R06.02 
ICD-9-CM: 786.05 worse after dialysis ? bp changes Vitals BP Pulse Height(growth percentile) Weight(growth percentile) BMI Smoking Status 117/67 73 5' 9\" (1.753 m) 188 lb (85.3 kg) 27.76 kg/m2 Former Smoker Vitals History BMI and BSA Data Body Mass Index Body Surface Area  
 27.76 kg/m 2 2.04 m 2 Preferred Pharmacy Pharmacy Name Phone [address] [facility] [address] [facility], [address] [place] [address] [phone] Your Updated Medication List  
  
   
This list is accurate as of 5/17/18  8:45 AM.  Always use your most recent med list.  
  
  
  
  
 aspirin delayed-release 81 mg tablet Take 81 mg by mouth daily. carvedilol 25 mg tablet Commonly known as:  Marifer Menghini Take 1 tablet by mouth two (2) times a day. cloNIDine HCl 0.1 mg tablet Commonly known as:  CATAPRES Take  by mouth two (2) times a day. multivitamin tablet Commonly known as:  ONE A DAY Take 1 tablet by mouth daily. PRAVACHOL 40 mg tablet Generic drug:  pravastatin Take 40 mg by mouth nightly. VELPHORO 500 mg Chew chewable tablet Generic drug:  sucroferric oxyhydroxide Take  by mouth three (3) times daily (with meals). VITAMIN D3 PO Take  by mouth. ZyrTEC 10 mg Cap Generic drug:  Cetirizine Take  by mouth daily. Follow-up Instructions Return in about 2 months (around 7/17/2018). To-Do List   
 05/20/2018 Cardiac Services:  2D ECHO COMPLETE ADULT (TTE) Introducing [facility]! [employer] introduces [employer] patient portal. Now you can access parts of your medical record, email your doctor's office, and request medication refills online. 1. In your internet browser, go to [email or web address] 2. Click on the First Time User? Click Here link in the Sign In box. You will see the New Member Sign Up page. 3. Enter your [employer] Access Code exactly as it appears below. You will not need to use this code after youve completed the sign-up process. If you do not sign up before the expiration date, you must request a new code. · [employer] Access Code: [email or web address] Expires: 8/15/2018  8:35 AM 
 
4. Enter the last four digits of your Social Security Number (xxxx) and Date of Birth (mm/dd/yyyy) as indicated and click Submit. You will be taken to the next sign-up page. 5. Create a [employer] ID. This will be your [employer] login ID and cannot be changed, so think of one that is secure and easy to remember. 6. Create a [employer] password. You can change your password at any time. 7. Enter your Password Reset Question and Answer. This can be used at a later time if you forget your password. 8. Enter your e-mail address. You will receive e-mail notification when new information is available in [address] 9. Click Sign Up. You can now view and download portions of your medical record. 10. Click the Download Summary menu link to download a portable copy of your medical information. If you have questions, please visit the Frequently Asked Questions section of the [employer] website. Remember, [employer] is NOT to be used for urgent needs. For medical emergencies, dial 911. Now available from your iPhone and Android! Please provide this summary of care documentation to your next provider. Your primary care clinician is listed as [address] [place] [address]. If you have any questions after today's visit, please call [phone].

## 2018-07-26 ENCOUNTER — OFFICE VISIT (OUTPATIENT)
Dept: CARDIOLOGY CLINIC | Age: 73
End: 2018-07-26

## 2018-07-26 VITALS
DIASTOLIC BLOOD PRESSURE: 94 MMHG | WEIGHT: 192 LBS | HEIGHT: 69 IN | BODY MASS INDEX: 28.44 KG/M2 | HEART RATE: 61 BPM | SYSTOLIC BLOOD PRESSURE: 172 MMHG

## 2018-07-26 DIAGNOSIS — N18.6 ESRD (END STAGE RENAL DISEASE) (HCC): ICD-10-CM

## 2018-07-26 DIAGNOSIS — E78.00 HYPERCHOLESTEROLEMIA: ICD-10-CM

## 2018-07-26 DIAGNOSIS — I25.10 CORONARY ARTERY DISEASE INVOLVING NATIVE CORONARY ARTERY OF NATIVE HEART WITHOUT ANGINA PECTORIS: Primary | ICD-10-CM

## 2018-07-26 DIAGNOSIS — I10 ESSENTIAL HYPERTENSION: ICD-10-CM

## 2018-07-26 NOTE — LETTER
[facility] 1945 7/26/2018 Dear Sherrill Mckinney MD 
 
I had the pleasure of evaluating  Mr. Trell Duffy in office today. Below are the relevant portions of my assessment and plan of care. ICD-10-CM ICD-9-CM 1. Coronary artery disease involving native coronary artery of native heart without angina pectoris I25.10 414.01 Clinically stable. Continue medical management. 2. Essential hypertension I10 401.9 Elevated today. Has not taken medication. On dialysis 3. Hypercholesterolemia E78.00 272.0 On statin. Lab with PCP. 4. ESRD (end stage renal disease) ([facility] [address] N18.6 585. 6 Currently on dialysis. Awaiting transplant decision Current Outpatient Prescriptions Medication Sig Dispense Refill  cloNIDine HCl (CATAPRES) 0.1 mg tablet Take  by mouth two (2) times a day.  sucroferric oxyhydroxide (VELPHORO) 500 mg chew chewable tablet Take  by mouth three (3) times daily (with meals).  aspirin delayed-release 81 mg tablet Take 81 mg by mouth daily.  pravastatin (PRAVACHOL) 40 mg tablet Take 40 mg by mouth nightly.  Cetirizine (ZYRTEC) 10 mg cap Take  by mouth daily.  CHOLECALCIFEROL, VITAMIN D3, (VITAMIN D3 PO) Take  by mouth.  multivitamin (ONE A DAY) tablet Take 1 tablet by mouth daily.  carvedilol (COREG) 25 mg tablet Take 1 tablet by mouth two (2) times a day. 60 tablet 5 No orders of the defined types were placed in this encounter. If you have questions, please do not hesitate to call me. I look forward to following Mr. Trell Duffy along with you. Sincerely, Cherry Lopez MD

## 2018-07-26 NOTE — PROGRESS NOTES
HISTORY  [facility] [address] is a 72 y.o. male. Hypertension   The history is provided by the patient. This is a chronic problem. The problem occurs constantly. The problem has not changed since onset. Pertinent negatives include no chest pain, no abdominal pain, no headaches and no shortness of breath. Nothing aggravates the symptoms. Cholesterol Problem   The history is provided by the patient. This is a chronic problem. The problem occurs constantly. Pertinent negatives include no chest pain, no abdominal pain, no headaches and no shortness of breath. Shortness of Breath   The history is provided by the patient. This is a recurrent problem. The problem occurs frequently. The problem has been gradually worsening. Pertinent negatives include no fever, no headaches, no cough, no sputum production, no hemoptysis, no wheezing, no PND, no orthopnea, no chest pain, no vomiting, no abdominal pain, no rash, no leg swelling and no claudication. Precipitated by: exertion,dialysis. Dizziness   The history is provided by the patient. This is a recurrent problem. The problem occurs rarely. Pertinent negatives include no chest pain, no abdominal pain, no headaches and no shortness of breath. Review of Systems   Constitutional: Negative for chills and fever. HENT: Negative for nosebleeds. Eyes: Negative for blurred vision and double vision. Respiratory: Negative for cough, hemoptysis, sputum production, shortness of breath and wheezing. Cardiovascular: Negative for chest pain, palpitations, orthopnea, claudication, leg swelling and PND. Gastrointestinal: Negative for abdominal pain, heartburn, nausea and vomiting. Musculoskeletal: Negative for myalgias. Skin: Negative for rash. Neurological: Positive for dizziness. Negative for weakness and headaches. Endo/Heme/Allergies: Does not bruise/bleed easily.      Family History   Problem Relation Age of Onset    Hypertension Sister  Hypertension Brother     Hypertension Father        Past Medical History:   Diagnosis Date    Anemia in chronic kidney disease     Chronic kidney disease     History of kidney transplant x3    Chronic kidney disease     HD- M-W-F    History of echocardiogram 01/08/2015    EF 55-60%. No WMA. Mod conc LVH. RVSP 40  mmHg. Mod LAE.  JCARLOS. Mild MR. Mild PI. Mild AoRE. Mild ascend'g Ao dilatation.  HTN (hypertension)     Hypercholesterolemia     Sleep apnea     CPAP       Past Surgical History:   Procedure Laterality Date    COLONOSCOPY N/A 1/16/2018    COLONOSCOPY performed by Arelis Quarles MD at [facility]COPY    HX PARTIAL THYROIDECTOMY      HX RENAL TRANSPLANT      X3    HX SPLENECTOMY      VASCULAR SURGERY PROCEDURE UNLIST      LEFT ARM FISTULA       Social History   Substance Use Topics    Smoking status: Former Smoker     Packs/day: 0.50     Years: 10.00     Quit date: 1/6/1975    Smokeless tobacco: Never Used    Alcohol use No       No Known Allergies    Prior to Admission medications    Medication Sig Start Date End Date Taking? Authorizing Provider   cloNIDine HCl (CATAPRES) 0.1 mg tablet Take  by mouth two (2) times a day. Yes Historical Provider   sucroferric oxyhydroxide (VELPHORO) 500 mg chew chewable tablet Take  by mouth three (3) times daily (with meals). Yes Historical Provider   aspirin delayed-release 81 mg tablet Take 81 mg by mouth daily. Yes Historical Provider   pravastatin (PRAVACHOL) 40 mg tablet Take 40 mg by mouth nightly. Yes Historical Provider   Cetirizine (ZYRTEC) 10 mg cap Take  by mouth daily. Yes Historical Provider   CHOLECALCIFEROL, VITAMIN D3, (VITAMIN D3 PO) Take  by mouth. Yes Historical Provider   multivitamin (ONE A DAY) tablet Take 1 tablet by mouth daily. Yes Historical Provider   carvedilol (COREG) 25 mg tablet Take 1 tablet by mouth two (2) times a day.  1/6/15  Yes Jeff Ramos MD         Visit Vitals    BP (!) 172/94    Pulse 61    Ht 5' 9\" (1.753 m)    Wt 87.1 kg (192 lb)    BMI 28.35 kg/m2         Physical Exam   Constitutional: He is oriented to person, place, and time. He appears well-developed and well-nourished. HENT:   Head: Normocephalic and atraumatic. Eyes: Conjunctivae are normal.   Neck: Neck supple. No JVD present. No tracheal deviation present. No thyromegaly present. Cardiovascular: Normal rate, regular rhythm and normal heart sounds. Exam reveals no gallop and no friction rub. No murmur heard. Pulmonary/Chest: No respiratory distress. He has no wheezes. He has rales. He exhibits no tenderness. Abdominal: Soft. There is no tenderness. Musculoskeletal: He exhibits no edema. Neurological: He is alert and oriented to person, place, and time. Skin: Skin is warm and dry. Psychiatric: He has a normal mood and affect. Mr. Kenia Young has a reminder for a \"due or due soon\" health maintenance. I have asked that he contact his primary care provider for follow-up on this health maintenance. [id number]  Left ventricle: Systolic function was normal by EF (biplane method of  disks). Ejection fraction was estimated in the range of 55 % to 60 %. No  obvious wall motion abnormalities identified in the views obtained. Wall  thickness was moderately to markedly increased. There was moderate  concentric hypertrophy. Right ventricle: Systolic pressure was mildly to moderately increased. Estimated peak pressure was in the range of 40 mmHg. Left atrium: The atrium was moderately dilated. Right atrium: The atrium was dilated. Mitral valve: There was mild regurgitation. Aortic valve: The valve was trileaflet. Pulmonic valve: There was mild regurgitation. Aorta, systemic arteries: The root exhibited mild dilatation.  There was  mild dilatation of the ascending aorta.  ekg-2/2017  Sinus  Rhythm   WITHIN NORMAL LIMITS    SUMMARY:3/2017-echo  Left ventricle: Systolic function was normal. Ejection fraction was  estimated in the range of 60 % to 65 %. There were no regional wall motion  abnormalities. There was moderate concentric hypertrophy. Features were  consistent with a pseudonormal left ventricular filling pattern, with  concomitant abnormal relaxation and increased filling pressure (grade 2  diastolic dysfunction). Right ventricle: The ventricle was dilated. Left atrium: The atrium was severely dilated. LA volume index was 73.44  ml/mï¾². Right atrium: The atrium was dilated. Mitral valve: There was mild annular calcification. There was mild  regurgitation. Tricuspid valve: There was mild regurgitation. Pulmonic valve: There was mild regurgitation. Aorta, systemic arteries: The root exhibited upper limit of normal size. Cath-9/2017-cath  HEMODYNAMICS: LVEDP is 16. PCW mean of 7. PA 35/10/16. RV  35/6 (EDP). RA mean is 3. Aortic pressure 160/70. No gradient at  aortic valve at the time of pullback. Cardiac output by Miguel method is 7.8 L/minute and by thermodilution is  5.6 L/minute. Cardiac index by Miguel is 3.99 and by thermodilution is 2.9. CONCLUSION:9/2017  1. Normal pulmonary artery pressure. 2. High normal left ventricular end-diastolic pressure without any aortic  valve gradient. 3. One-vessel coronary artery disease with 70% proximal and ostial  stenosis of second diagonal artery without any flow limitation  angiographically. 4. Normal left ventricular wall motion and ejection fraction in the range  of 60% to 65%. Interpretation Summary 7/2018  · Calculated left ventricular ejection fraction is 65%. Left ventricular mild concentric hypertrophy observed. Normal left ventricular wall motion, no regional wall motion abnormality noted. Mild (grade 1) left ventricular diastolic dysfunction. · Left atrial cavity size is moderately dilated. · Right ventricular cavity size is mildly dilated  · Right atrial cavity size is mildly dilated.   · Mild aortic valve sclerosis. · Mild mitral valve regurgitation. · Mild tricuspid valve regurgitation is present. Pulmonary arterial systolic pressure is 35 mmHg. There is no evidence of pulmonary hypertension. · Mild aortic root and ascending aorta dilatation.           Assessment         ICD-10-CM ICD-9-CM    1. Coronary artery disease involving native coronary artery of native heart without angina pectoris I25.10 414.01     Clinically stable. Continue medical management. 2. Essential hypertension I10 401.9     Elevated today. Has not taken medication. On dialysis   3. Hypercholesterolemia E78.00 272.0     On statin. Lab with PCP. 4. ESRD (end stage renal disease) ([facility] [address] N18.6 585. 6     Currently on dialysis. Awaiting transplant decision      10/2017  Ok for renal transplant from [facility]  No orders of the defined types were placed in this encounter. Follow-up Disposition:  Return in about 6 months (around 1/26/2019).

## 2018-07-26 NOTE — MR AVS SNAPSHOT
[address] [facility] [address] 
 
 
 [address] [facility] [address], [address] [facility] [address] 
[phone] Patient: [facility] ADAM PICKETT MRN: [id number] IWD:[birth year] Visit Information Date & Time Provider Department Dept. Phone Encounter #  
 7/26/2018  8:30 AM Jazmin Valderrama MD [facility] [address] [place] [address] [id number] Follow-up Instructions Return in about 6 months (around 1/26/2019). Your Appointments 1/10/2019  8:30 AM  
Office Visit with Jazmin Valderrama MD  
[facility]) Appt Note: 6 months [address] [facility], [address] [facility] [id number], [address] Upcoming Health Maintenance Date Due Hepatitis C Screening 1945 DTaP/Tdap/Td series (1 - Tdap) 7/7/1968 FOBT Q 1 YEAR AGE 50-75 7/7/1997 ZOSTER VACCINE AGE 60> 5/7/2007 GLAUCOMA SCREENING Q2Y 7/7/2012 Pneumococcal 65+ High/Highest Risk (1 of 2 - PCV13) 7/7/2012 MEDICARE YEARLY EXAM 3/14/2018 Influenza Age 5 to Adult 8/1/2018 Allergies as of 7/26/2018  Review Complete On: 7/26/2018 By: Rayetta Sicard No Known Allergies Current Immunizations  Reviewed on 2/21/2017 No immunizations on file. Not reviewed this visit You Were Diagnosed With   
  
 Codes Comments Coronary artery disease involving native coronary artery of native heart without angina pectoris    -  Primary ICD-10-CM: I25.10 ICD-9-CM: 414.01 Clinically stable. Continue medical management. Essential hypertension     ICD-10-CM: I10 
ICD-9-CM: 401.9 Elevated today. Has not taken medication. On dialysis Hypercholesterolemia     ICD-10-CM: E78.00 ICD-9-CM: 272.0 On statin. Lab with PCP. ESRD (end stage renal disease) ([facility] [address]     ICD-10-CM: N18.6 ICD-9-CM: 607. 6 Currently on dialysis. Awaiting transplant decision Vitals BP Pulse Height(growth percentile) Weight(growth percentile) BMI Smoking Status (!) 172/94 61 5' 9\" (1.753 m) 192 lb (87.1 kg) 28.35 kg/m2 Former Smoker Vitals History BMI and BSA Data Body Mass Index Body Surface Area  
 28.35 kg/m 2 2.06 m 2 Preferred Pharmacy Pharmacy Name Phone [address] [place] [phone] Your Updated Medication List  
  
   
This list is accurate as of 7/26/18  8:42 AM.  Always use your most recent med list.  
  
  
  
  
 aspirin delayed-release 81 mg tablet Take 81 mg by mouth daily. carvedilol 25 mg tablet Commonly known as:  Aime Been Take 1 tablet by mouth two (2) times a day. cloNIDine HCl 0.1 mg tablet Commonly known as:  CATAPRES Take  by mouth two (2) times a day. multivitamin tablet Commonly known as:  ONE A DAY Take 1 tablet by mouth daily. PRAVACHOL 40 mg tablet Generic drug:  pravastatin Take 40 mg by mouth nightly. VELPHORO 500 mg Chew chewable tablet Generic drug:  sucroferric oxyhydroxide Take  by mouth three (3) times daily (with meals). VITAMIN D3 PO Take  by mouth. ZyrTEC 10 mg Cap Generic drug:  Cetirizine Take  by mouth daily. Follow-up Instructions Return in about 6 months (around 1/26/2019). Introducing [facility]! Qi Cedillo introduces [employer] patient portal. Now you can access parts of your medical record, email your doctor's office, and request medication refills online. 1. In your internet browser, go to [email or web address] 2. Click on the First Time User? Click Here link in the Sign In box. You will see the New Member Sign Up page. 3. Enter your [employer] Access Code exactly as it appears below. You will not need to use this code after youve completed the sign-up process. If you do not sign up before the expiration date, you must request a new code. · [employer] Access Code: [email or web address] Expires: 8/15/2018  8:35 AM 
 
4. Enter the last four digits of your Social Security Number (xxxx) and Date of Birth (mm/dd/yyyy) as indicated and click Submit. You will be taken to the next sign-up page. 5. Create a [employer] ID. This will be your [employer] login ID and cannot be changed, so think of one that is secure and easy to remember. 6. Create a [employer] password. You can change your password at any time. 7. Enter your Password Reset Question and Answer. This can be used at a later time if you forget your password. 8. Enter your e-mail address. You will receive e-mail notification when new information is available in [address] 9. Click Sign Up. You can now view and download portions of your medical record. 10. Click the Download Summary menu link to download a portable copy of your medical information. If you have questions, please visit the Frequently Asked Questions section of the [employer] website. Remember, [employer] is NOT to be used for urgent needs. For medical emergencies, dial 911. Now available from your iPhone and Android! Please provide this summary of care documentation to your next provider. Your primary care clinician is listed as Alma Clark. If you have any questions after today's visit, please call [phone].

## 2018-07-26 NOTE — PROGRESS NOTES
1. Have you been to the ER, urgent care clinic since your last visit? Hospitalized since your last visit? No    2. Have you seen or consulted any other health care providers outside of the [address] [place] [address] since your last visit? Include any pap smears or colon screening.  Yes Where: [facility]

## 2019-02-07 ENCOUNTER — OFFICE VISIT (OUTPATIENT)
Dept: CARDIOLOGY CLINIC | Age: 74
End: 2019-02-07

## 2019-02-07 VITALS
HEART RATE: 70 BPM | SYSTOLIC BLOOD PRESSURE: 129 MMHG | DIASTOLIC BLOOD PRESSURE: 70 MMHG | BODY MASS INDEX: 28.73 KG/M2 | WEIGHT: 194 LBS | HEIGHT: 69 IN

## 2019-02-07 DIAGNOSIS — I10 ESSENTIAL HYPERTENSION: ICD-10-CM

## 2019-02-07 DIAGNOSIS — I25.10 CORONARY ARTERY DISEASE INVOLVING NATIVE CORONARY ARTERY OF NATIVE HEART WITHOUT ANGINA PECTORIS: Primary | ICD-10-CM

## 2019-02-07 DIAGNOSIS — E78.00 HYPERCHOLESTEROLEMIA: ICD-10-CM

## 2019-02-07 DIAGNOSIS — N18.6 ESRD (END STAGE RENAL DISEASE) (HCC): ICD-10-CM

## 2019-02-07 NOTE — PROGRESS NOTES
HISTORY  [facility] [address] is a 72 y.o. male. Hypertension   The history is provided by the patient. This is a chronic problem. The problem occurs constantly. The problem has not changed since onset. Pertinent negatives include no chest pain, no abdominal pain, no headaches and no shortness of breath. Nothing aggravates the symptoms. Cholesterol Problem   The history is provided by the patient. This is a chronic problem. The problem occurs constantly. Pertinent negatives include no chest pain, no abdominal pain, no headaches and no shortness of breath. Shortness of Breath   The history is provided by the patient. This is a recurrent problem. The problem occurs frequently. The problem has been gradually worsening. Pertinent negatives include no fever, no headaches, no cough, no sputum production, no hemoptysis, no wheezing, no PND, no orthopnea, no chest pain, no vomiting, no abdominal pain, no rash, no leg swelling and no claudication. Precipitated by: exertion,dialysis. Dizziness   The history is provided by the patient. This is a recurrent problem. The problem occurs rarely. Pertinent negatives include no chest pain, no abdominal pain, no headaches and no shortness of breath. Review of Systems   Constitutional: Negative for chills and fever. HENT: Negative for nosebleeds. Eyes: Negative for blurred vision and double vision. Respiratory: Negative for cough, hemoptysis, sputum production, shortness of breath and wheezing. Cardiovascular: Negative for chest pain, palpitations, orthopnea, claudication, leg swelling and PND. Gastrointestinal: Negative for abdominal pain, heartburn, nausea and vomiting. Musculoskeletal: Negative for myalgias. Skin: Negative for rash. Neurological: Positive for dizziness. Negative for weakness and headaches. Endo/Heme/Allergies: Does not bruise/bleed easily.      Family History   Problem Relation Age of Onset    Hypertension Sister  Hypertension Brother     Hypertension Father        Past Medical History:   Diagnosis Date    Anemia in chronic kidney disease     Chronic kidney disease     History of kidney transplant x3    Chronic kidney disease     HD- M-W-F    History of echocardiogram 2015    EF 55-60%. No WMA. Mod conc LVH. RVSP 40  mmHg. Mod LAE.  JCARLOS. Mild MR. Mild PI. Mild AoRE. Mild ascend'g Ao dilatation.  HTN (hypertension)     Hypercholesterolemia     Sleep apnea     CPAP       Past Surgical History:   Procedure Laterality Date    COLONOSCOPY N/A 2018    COLONOSCOPY performed by Ryanne Fermin MD at [address] HX PARTIAL THYROIDECTOMY      HX RENAL TRANSPLANT      X3    HX SPLENECTOMY      VASCULAR SURGERY PROCEDURE UNLIST      LEFT ARM FISTULA       Social History     Tobacco Use    Smoking status: Former Smoker     Packs/day: 0.50     Years: 10.00     Pack years: 5.00     Last attempt to quit: 1975     Years since quittin.1    Smokeless tobacco: Never Used   Substance Use Topics    Alcohol use: No       No Known Allergies    Prior to Admission medications    Medication Sig Start Date End Date Taking? Authorizing Provider   ferric citrate (AURYXIA) 210 mg iron tablet Take  by mouth three (3) times daily (with meals). Yes Provider, Historical   cloNIDine HCl (CATAPRES) 0.1 mg tablet Take  by mouth two (2) times a day. Yes Provider, Historical   aspirin delayed-release 81 mg tablet Take 81 mg by mouth daily. Yes Provider, Historical   pravastatin (PRAVACHOL) 40 mg tablet Take 40 mg by mouth nightly. Yes Provider, Historical   Cetirizine (ZYRTEC) 10 mg cap Take  by mouth daily. Yes Provider, Historical   CHOLECALCIFEROL, VITAMIN D3, (VITAMIN D3 PO) Take  by mouth. Yes Provider, Historical   multivitamin (ONE A DAY) tablet Take 1 tablet by mouth daily. Yes Provider, Historical   carvedilol (COREG) 25 mg tablet Take 1 tablet by mouth two (2) times a day.  1/6/15  Yes Jona Smith MD         Visit Vitals  /70   Pulse 70   Ht 5' 9\" (1.753 m)   Wt 88 kg (194 lb)   BMI 28.65 kg/m²         Physical Exam   Constitutional: He is oriented to person, place, and time. He appears well-developed and well-nourished. HENT:   Head: Normocephalic and atraumatic. Eyes: Conjunctivae are normal.   Neck: Neck supple. No JVD present. No tracheal deviation present. No thyromegaly present. Cardiovascular: Normal rate and regular rhythm. Exam reveals no gallop and no friction rub. Murmur heard. Holosystolic murmur is present at the lower left sternal border. Pulmonary/Chest: No respiratory distress. He has no wheezes. He has no rales. He exhibits no tenderness. Abdominal: Soft. There is no tenderness. Musculoskeletal: He exhibits no edema. Neurological: He is alert and oriented to person, place, and time. Skin: Skin is warm and dry. Psychiatric: He has a normal mood and affect. Mr. Hayde Cruz has a reminder for a \"due or due soon\" health maintenance. I have asked that he contact his primary care provider for follow-up on this health maintenance. [id number]  Left ventricle: Systolic function was normal by EF (biplane method of  disks). Ejection fraction was estimated in the range of 55 % to 60 %. No  obvious wall motion abnormalities identified in the views obtained. Wall  thickness was moderately to markedly increased. There was moderate  concentric hypertrophy. Right ventricle: Systolic pressure was mildly to moderately increased. Estimated peak pressure was in the range of 40 mmHg. Left atrium: The atrium was moderately dilated. Right atrium: The atrium was dilated. Mitral valve: There was mild regurgitation. Aortic valve: The valve was trileaflet. Pulmonic valve: There was mild regurgitation. Aorta, systemic arteries: The root exhibited mild dilatation.  There was  mild dilatation of the ascending aorta.  ekg-2/2017  Sinus  Rhythm   WITHIN NORMAL LIMITS    SUMMARY:3/2017-echo  Left ventricle: Systolic function was normal. Ejection fraction was  estimated in the range of 60 % to 65 %. There were no regional wall motion  abnormalities. There was moderate concentric hypertrophy. Features were  consistent with a pseudonormal left ventricular filling pattern, with  concomitant abnormal relaxation and increased filling pressure (grade 2  diastolic dysfunction). Right ventricle: The ventricle was dilated. Left atrium: The atrium was severely dilated. LA volume index was 73.44  ml/mï¾². Right atrium: The atrium was dilated. Mitral valve: There was mild annular calcification. There was mild  regurgitation. Tricuspid valve: There was mild regurgitation. Pulmonic valve: There was mild regurgitation. Aorta, systemic arteries: The root exhibited upper limit of normal size. Cath-9/2017-cath  HEMODYNAMICS: LVEDP is 16. PCW mean of 7. PA 35/10/16. RV  35/6 (EDP). RA mean is 3. Aortic pressure 160/70. No gradient at  aortic valve at the time of pullback. Cardiac output by Miguel method is 7.8 L/minute and by thermodilution is  5.6 L/minute. Cardiac index by Miguel is 3.99 and by thermodilution is 2.9. CONCLUSION:9/2017  1. Normal pulmonary artery pressure. 2. High normal left ventricular end-diastolic pressure without any aortic  valve gradient. 3. One-vessel coronary artery disease with 70% proximal and ostial  stenosis of second diagonal artery without any flow limitation  angiographically. 4. Normal left ventricular wall motion and ejection fraction in the range  of 60% to 65%. Interpretation Summary 7/2018  · Calculated left ventricular ejection fraction is 65%. Left ventricular mild concentric hypertrophy observed. Normal left ventricular wall motion, no regional wall motion abnormality noted. Mild (grade 1) left ventricular diastolic dysfunction. · Left atrial cavity size is moderately dilated.   · Right ventricular cavity size is mildly dilated  · Right atrial cavity size is mildly dilated. · Mild aortic valve sclerosis. · Mild mitral valve regurgitation. · Mild tricuspid valve regurgitation is present. Pulmonary arterial systolic pressure is 35 mmHg. There is no evidence of pulmonary hypertension. · Mild aortic root and ascending aorta dilatation.           Assessment         ICD-10-CM ICD-9-CM    1. Coronary artery disease involving native coronary artery of native heart without angina pectoris I25.10 414.01     Stable continue treatment   2. Essential hypertension I10 401.9     Blood pressure is controlled   3. ESRD (end stage renal disease) ([facility] [address] N18.6 585.6     On dialysis. Not considered candidate for repeat transplant   4. Hypercholesterolemia E78.00 272.0     Statin lab with PCP     10/2017  Ok for renal transplant from [facility]  2019  Cardiac status stable. Not considered renal transplant candidate due to age  No orders of the defined types were placed in this encounter. Follow-up Disposition:  Return in about 6 months (around 8/7/2019).

## 2019-02-07 NOTE — PROGRESS NOTES
1. Have you been to the ER, urgent care clinic since your last visit? 3 Hospitalized since your last visit? No    2. Have you seen or consulted any other health care providers outside of the [address] [place] [address] since your last visit? Include any pap smears or colon screening.  No

## 2019-02-07 NOTE — LETTER
General Milan Martin 1945 2/7/2019 Dear Hamida Welch MD 
 
I had the pleasure of evaluating  Mr. Milan Martin in office today. Below are the relevant portions of my assessment and plan of care. ICD-10-CM ICD-9-CM 1. Coronary artery disease involving native coronary artery of native heart without angina pectoris I25.10 414.01 Stable continue treatment 2. Essential hypertension I10 401.9 Blood pressure is controlled 3. ESRD (end stage renal disease) ([facility] [address] N18.6 585.6 On dialysis. Not considered candidate for repeat transplant 4. Hypercholesterolemia E78.00 272.0 Statin lab with PCP Current Outpatient Medications Medication Sig Dispense Refill  ferric citrate (AURYXIA) 210 mg iron tablet Take  by mouth three (3) times daily (with meals).  cloNIDine HCl (CATAPRES) 0.1 mg tablet Take  by mouth two (2) times a day.  aspirin delayed-release 81 mg tablet Take 81 mg by mouth daily.  pravastatin (PRAVACHOL) 40 mg tablet Take 40 mg by mouth nightly.  Cetirizine (ZYRTEC) 10 mg cap Take  by mouth daily.  CHOLECALCIFEROL, VITAMIN D3, (VITAMIN D3 PO) Take  by mouth.  multivitamin (ONE A DAY) tablet Take 1 tablet by mouth daily.  carvedilol (COREG) 25 mg tablet Take 1 tablet by mouth two (2) times a day. 60 tablet 5 Orders Placed This Encounter  ferric citrate (AURYXIA) 210 mg iron tablet Sig: Take  by mouth three (3) times daily (with meals). If you have questions, please do not hesitate to call me. I look forward to following Mr. Milan Martin along with you. Sincerely, Jordin Hughes MD

## 2019-08-08 ENCOUNTER — OFFICE VISIT (OUTPATIENT)
Dept: CARDIOLOGY CLINIC | Age: 74
End: 2019-08-08

## 2019-08-08 VITALS
HEART RATE: 70 BPM | DIASTOLIC BLOOD PRESSURE: 80 MMHG | BODY MASS INDEX: 28.14 KG/M2 | SYSTOLIC BLOOD PRESSURE: 151 MMHG | WEIGHT: 190 LBS | HEIGHT: 69 IN

## 2019-08-08 DIAGNOSIS — E78.00 HYPERCHOLESTEROLEMIA: ICD-10-CM

## 2019-08-08 DIAGNOSIS — I11.9 HYPERTENSIVE LEFT VENTRICULAR HYPERTROPHY, WITHOUT HEART FAILURE: ICD-10-CM

## 2019-08-08 DIAGNOSIS — N18.6 ESRD (END STAGE RENAL DISEASE) (HCC): ICD-10-CM

## 2019-08-08 DIAGNOSIS — I25.10 CORONARY ARTERY DISEASE INVOLVING NATIVE CORONARY ARTERY OF NATIVE HEART WITHOUT ANGINA PECTORIS: Primary | ICD-10-CM

## 2019-08-08 DIAGNOSIS — I10 ESSENTIAL HYPERTENSION: ICD-10-CM

## 2019-08-08 NOTE — PROGRESS NOTES
HISTORY OF PRESENT ILLNESS  General Nyla Meyer is a 69 y.o. male. Hypertension   The history is provided by the patient. This is a chronic problem. The problem occurs constantly. The problem has not changed since onset. Pertinent negatives include no chest pain, no abdominal pain, no headaches and no shortness of breath. Nothing aggravates the symptoms. Cholesterol Problem   The history is provided by the patient. This is a chronic problem. The problem occurs constantly. Pertinent negatives include no chest pain, no abdominal pain, no headaches and no shortness of breath. Shortness of Breath   The history is provided by the patient. This is a recurrent problem. The problem occurs frequently. The problem has been gradually worsening. Pertinent negatives include no fever, no headaches, no cough, no sputum production, no hemoptysis, no wheezing, no PND, no orthopnea, no chest pain, no vomiting, no abdominal pain, no rash, no leg swelling and no claudication. Precipitated by: exertion,dialysis. Dizziness   The history is provided by the patient. This is a recurrent problem. The problem occurs rarely. Pertinent negatives include no chest pain, no abdominal pain, no headaches and no shortness of breath. Review of Systems   Constitutional: Negative for chills and fever. HENT: Negative for nosebleeds. Eyes: Negative for blurred vision and double vision. Respiratory: Negative for cough, hemoptysis, sputum production, shortness of breath and wheezing. Cardiovascular: Negative for chest pain, palpitations, orthopnea, claudication, leg swelling and PND. Gastrointestinal: Negative for abdominal pain, heartburn, nausea and vomiting. Musculoskeletal: Negative for myalgias. Skin: Negative for rash. Neurological: Positive for dizziness. Negative for weakness and headaches. Endo/Heme/Allergies: Does not bruise/bleed easily.      Family History   Problem Relation Age of Onset    Hypertension Sister  Hypertension Brother     Hypertension Father        Past Medical History:   Diagnosis Date    Anemia in chronic kidney disease     Chronic kidney disease     History of kidney transplant x3    Chronic kidney disease     HD- M-W-F    History of echocardiogram 2015    EF 55-60%. No WMA. Mod conc LVH. RVSP 40  mmHg. Mod LAE.  JCARLOS. Mild MR. Mild PI. Mild AoRE. Mild ascend'g Ao dilatation.  HTN (hypertension)     Hypercholesterolemia     Sleep apnea     CPAP       Past Surgical History:   Procedure Laterality Date    COLONOSCOPY N/A 2018    COLONOSCOPY performed by Marsha Anderson MD at [address] HX PARTIAL THYROIDECTOMY      HX RENAL TRANSPLANT      X3    HX SPLENECTOMY      VASCULAR SURGERY PROCEDURE UNLIST      LEFT ARM FISTULA       Social History     Tobacco Use    Smoking status: Former Smoker     Packs/day: 0.50     Years: 10.00     Pack years: 5.00     Last attempt to quit: 1975     Years since quittin.6    Smokeless tobacco: Never Used   Substance Use Topics    Alcohol use: No       No Known Allergies    Prior to Admission medications    Medication Sig Start Date End Date Taking? Authorizing Provider   ferric citrate (AURYXIA) 210 mg iron tablet Take  by mouth three (3) times daily (with meals). Yes Provider, Historical   cloNIDine HCl (CATAPRES) 0.1 mg tablet Take  by mouth two (2) times a day. Yes Provider, Historical   aspirin delayed-release 81 mg tablet Take 81 mg by mouth daily. Yes Provider, Historical   pravastatin (PRAVACHOL) 40 mg tablet Take 40 mg by mouth nightly. Yes Provider, Historical   Cetirizine (ZYRTEC) 10 mg cap Take  by mouth daily. Yes Provider, Historical   CHOLECALCIFEROL, VITAMIN D3, (VITAMIN D3 PO) Take  by mouth. Yes Provider, Historical   multivitamin (ONE A DAY) tablet Take 1 tablet by mouth daily. Yes Provider, Historical   carvedilol (COREG) 25 mg tablet Take 1 tablet by mouth two (2) times a day.  1/6/15  Yes Jeff Okeefe MD         Visit Vitals  /80   Pulse 70   Ht 5' 9\" (1.753 m)   Wt 86.2 kg (190 lb)   BMI 28.06 kg/m²         Physical Exam   Constitutional: He is oriented to person, place, and time. He appears well-developed and well-nourished. HENT:   Head: Normocephalic and atraumatic. Eyes: Conjunctivae are normal.   Neck: Neck supple. No JVD present. No tracheal deviation present. No thyromegaly present. Cardiovascular: Normal rate and regular rhythm. Exam reveals no gallop and no friction rub. Murmur heard. Holosystolic murmur is present at the lower left sternal border. Pulmonary/Chest: No respiratory distress. He has no wheezes. He has no rales. He exhibits no tenderness. Abdominal: Soft. There is no tenderness. Musculoskeletal: He exhibits no edema. Neurological: He is alert and oriented to person, place, and time. Skin: Skin is warm and dry. Psychiatric: He has a normal mood and affect. Mr. Trell Duffy has a reminder for a \"due or due soon\" health maintenance. I have asked that he contact his primary care provider for follow-up on this health maintenance. [facility]:[id number]  Left ventricle: Systolic function was normal by EF (biplane method of  disks). Ejection fraction was estimated in the range of 55 % to 60 %. No  obvious wall motion abnormalities identified in the views obtained. Wall  thickness was moderately to markedly increased. There was moderate  concentric hypertrophy. Right ventricle: Systolic pressure was mildly to moderately increased. Estimated peak pressure was in the range of 40 mmHg. Left atrium: The atrium was moderately dilated. Right atrium: The atrium was dilated. Mitral valve: There was mild regurgitation. Aortic valve: The valve was trileaflet. Pulmonic valve: There was mild regurgitation. Aorta, systemic arteries: The root exhibited mild dilatation.  There was  mild dilatation of the ascending aorta.  ekg-2017  Sinus  Rhythm WITHIN NORMAL LIMITS    SUMMARY:3/2017-echo  Left ventricle: Systolic function was normal. Ejection fraction was  estimated in the range of 60 % to 65 %. There were no regional wall motion  abnormalities. There was moderate concentric hypertrophy. Features were  consistent with a pseudonormal left ventricular filling pattern, with  concomitant abnormal relaxation and increased filling pressure (grade 2  diastolic dysfunction). Right ventricle: The ventricle was dilated. Left atrium: The atrium was severely dilated. LA volume index was 73.44  ml/mï¾². Right atrium: The atrium was dilated. Mitral valve: There was mild annular calcification. There was mild  regurgitation. Tricuspid valve: There was mild regurgitation. Pulmonic valve: There was mild regurgitation. Aorta, systemic arteries: The root exhibited upper limit of normal size. Cath-9/2017-cath  HEMODYNAMICS: LVEDP is 16. PCW mean of 7. PA 35/10/16. RV  35/6 (EDP). RA mean is 3. Aortic pressure 160/70. No gradient at  aortic valve at the time of pullback. Cardiac output by Miguel method is 7.8 L/minute and by thermodilution is  5.6 L/minute. Cardiac index by Miguel is 3.99 and by thermodilution is 2.9. CONCLUSION:9/2017  1. Normal pulmonary artery pressure. 2. High normal left ventricular end-diastolic pressure without any aortic  valve gradient. 3. One-vessel coronary artery disease with 70% proximal and ostial  stenosis of second diagonal artery without any flow limitation  angiographically. 4. Normal left ventricular wall motion and ejection fraction in the range  of 60% to 65%. Interpretation Summary 7/2018  · Calculated left ventricular ejection fraction is 65%. Left ventricular mild concentric hypertrophy observed. Normal left ventricular wall motion, no regional wall motion abnormality noted. Mild (grade 1) left ventricular diastolic dysfunction. · Left atrial cavity size is moderately dilated.   · Right ventricular cavity size is mildly dilated  · Right atrial cavity size is mildly dilated. · Mild aortic valve sclerosis. · Mild mitral valve regurgitation. · Mild tricuspid valve regurgitation is present. Pulmonary arterial systolic pressure is 35 mmHg. There is no evidence of pulmonary hypertension. · Mild aortic root and ascending aorta dilatation.           Assessment         ICD-10-CM ICD-9-CM    1. Coronary artery disease involving native coronary artery of native heart without angina pectoris I25.10 414.01     Stable continue current treatment   2. Essential hypertension I10 401.9     Stable on treatment   3. ESRD (end stage renal disease) ([facility] [address] N18.6 585.6     on dialysis  tolerating well   4. Hypercholesterolemia E78.00 272.0     Continue treatment lab with PCP   5. Hypertensive left ventricular hypertrophy, without heart failure I11.9 402.90     Moderate LVH. Continue treatment of hypertension. Monitor on dialysis at present     10/2017  Samina Mcnulty [facility]  2019  Cardiac status stable. Not considered renal transplant candidate due to age  [age]  Cardiac status stable. Continue current treatment. No orders of the defined types were placed in this encounter. Follow-up and Dispositions    · Return in about 6 months (around 2/8/2020).

## 2019-08-08 NOTE — PROGRESS NOTES
1. Have you been to the ER, urgent care clinic since your last visit? Hospitalized since your last visit? No    2. Have you seen or consulted any other health care providers outside of the [address] [place] [address] since your last visit? Include any pap smears or colon screening.  No

## 2020-02-20 ENCOUNTER — OFFICE VISIT (OUTPATIENT)
Dept: CARDIOLOGY CLINIC | Age: 75
End: 2020-02-20

## 2020-02-20 VITALS
HEIGHT: 69 IN | HEART RATE: 63 BPM | WEIGHT: 194 LBS | DIASTOLIC BLOOD PRESSURE: 84 MMHG | OXYGEN SATURATION: 98 % | BODY MASS INDEX: 28.73 KG/M2 | TEMPERATURE: 98.3 F | SYSTOLIC BLOOD PRESSURE: 157 MMHG

## 2020-02-20 DIAGNOSIS — E78.00 HYPERCHOLESTEROLEMIA: ICD-10-CM

## 2020-02-20 DIAGNOSIS — I11.9 HYPERTENSIVE LEFT VENTRICULAR HYPERTROPHY, WITHOUT HEART FAILURE: ICD-10-CM

## 2020-02-20 DIAGNOSIS — I25.10 CORONARY ARTERY DISEASE INVOLVING NATIVE CORONARY ARTERY OF NATIVE HEART WITHOUT ANGINA PECTORIS: Primary | ICD-10-CM

## 2020-02-20 DIAGNOSIS — I10 ESSENTIAL HYPERTENSION: ICD-10-CM

## 2020-02-20 DIAGNOSIS — N18.6 ESRD (END STAGE RENAL DISEASE) (HCC): ICD-10-CM

## 2020-02-20 RX ORDER — TAMSULOSIN HYDROCHLORIDE 0.4 MG/1
0.4 CAPSULE ORAL DAILY
COMMUNITY
End: 2022-08-17

## 2020-02-20 NOTE — PROGRESS NOTES
HISTORY OF PRESENT ILLNESS  General 1981 Latrell Young is a 69 y.o. male. Hypertension   The history is provided by the patient. This is a chronic problem. The problem occurs constantly. The problem has not changed since onset. Pertinent negatives include no chest pain, no abdominal pain, no headaches and no shortness of breath. Nothing aggravates the symptoms. Cholesterol Problem   The history is provided by the patient. This is a chronic problem. The problem occurs constantly. Pertinent negatives include no chest pain, no abdominal pain, no headaches and no shortness of breath. Shortness of Breath   The history is provided by the patient. This is a recurrent problem. The problem occurs frequently. The problem has been gradually worsening. Pertinent negatives include no fever, no headaches, no cough, no sputum production, no hemoptysis, no wheezing, no PND, no orthopnea, no chest pain, no vomiting, no abdominal pain, no rash, no leg swelling and no claudication. Precipitated by: exertion,dialysis. Dizziness   The history is provided by the patient. This is a recurrent problem. The problem occurs rarely. Pertinent negatives include no chest pain, no abdominal pain, no headaches and no shortness of breath. Review of Systems   Constitutional: Negative for chills and fever. HENT: Negative for nosebleeds. Eyes: Negative for blurred vision and double vision. Respiratory: Negative for cough, hemoptysis, sputum production, shortness of breath and wheezing. Cardiovascular: Negative for chest pain, palpitations, orthopnea, claudication, leg swelling and PND. Gastrointestinal: Negative for abdominal pain, heartburn, nausea and vomiting. Musculoskeletal: Negative for myalgias. Skin: Negative for rash. Neurological: Positive for dizziness. Negative for weakness and headaches. Endo/Heme/Allergies: Does not bruise/bleed easily.      Family History   Problem Relation Age of Onset    Hypertension Sister  Hypertension Brother     Hypertension Father        Past Medical History:   Diagnosis Date    Anemia in chronic kidney disease     Chronic kidney disease     History of kidney transplant x3    Chronic kidney disease     HD- M-W-F    History of echocardiogram 2015    EF 55-60%. No WMA. Mod conc LVH. RVSP 40  mmHg. Mod LAE.  JCARLOS. Mild MR. Mild PI. Mild AoRE. Mild ascend'g Ao dilatation.  HTN (hypertension)     Hypercholesterolemia     Sleep apnea     CPAP       Past Surgical History:   Procedure Laterality Date    COLONOSCOPY N/A 2018    COLONOSCOPY performed by Clarence Howe MD at [address] HX PARTIAL THYROIDECTOMY      HX RENAL TRANSPLANT      X3    HX SPLENECTOMY      VASCULAR SURGERY PROCEDURE UNLIST      LEFT ARM FISTULA       Social History     Tobacco Use    Smoking status: Former Smoker     Packs/day: 0.50     Years: 10.00     Pack years: 5.00     Last attempt to quit: 1975     Years since quittin.1    Smokeless tobacco: Never Used   Substance Use Topics    Alcohol use: No       No Known Allergies    Prior to Admission medications    Medication Sig Start Date End Date Taking? Authorizing Provider   tamsulosin (FLOMAX) 0.4 mg capsule Take 0.4 mg by mouth daily. Yes Provider, Historical   ferric citrate (AURYXIA) 210 mg iron tablet Take 420 mg by mouth three (3) times daily (with meals). Yes Provider, Historical   cloNIDine HCl (CATAPRES) 0.1 mg tablet Take  by mouth two (2) times a day. Yes Provider, Historical   aspirin delayed-release 81 mg tablet Take 81 mg by mouth daily. Yes Provider, Historical   pravastatin (PRAVACHOL) 40 mg tablet Take 40 mg by mouth nightly. Yes Provider, Historical   Cetirizine (ZYRTEC) 10 mg cap Take  by mouth daily. Yes Provider, Historical   CHOLECALCIFEROL, VITAMIN D3, (VITAMIN D3 PO) Take  by mouth. Yes Provider, Historical   multivitamin (ONE A DAY) tablet Take 1 tablet by mouth daily.    Yes Provider, Historical   carvedilol (COREG) 25 mg tablet Take 1 tablet by mouth two (2) times a day. 1/6/15  Yes Sunitha Narvaez MD         Visit Vitals  /84 (BP 1 Location: Left arm, BP Patient Position: Sitting)   Pulse 63   Temp 98.3 °F (36.8 °C) (Oral)   Ht 5' 9\" (1.753 m)   Wt 88 kg (194 lb)   SpO2 98%   BMI 28.65 kg/m²         Physical Exam   Constitutional: He is oriented to person, place, and time. He appears well-developed and well-nourished. HENT:   Head: Normocephalic and atraumatic. Eyes: Conjunctivae are normal.   Neck: Neck supple. No JVD present. No tracheal deviation present. No thyromegaly present. Cardiovascular: Normal rate and regular rhythm. Exam reveals no gallop and no friction rub. Murmur heard. Holosystolic murmur is present at the lower left sternal border. Pulmonary/Chest: No respiratory distress. He has no wheezes. He has no rales. He exhibits no tenderness. Abdominal: Soft. There is no abdominal tenderness. Musculoskeletal:         General: No edema. Neurological: He is alert and oriented to person, place, and time. Skin: Skin is warm and dry. Psychiatric: He has a normal mood and affect. Mr. Marichuy Cohen has a reminder for a \"due or due soon\" health maintenance. I have asked that he contact his primary care provider for follow-up on this health maintenance. [id number]  Left ventricle: Systolic function was normal by EF (biplane method of  disks). Ejection fraction was estimated in the range of 55 % to 60 %. No  obvious wall motion abnormalities identified in the views obtained. Wall  thickness was moderately to markedly increased. There was moderate  concentric hypertrophy. Right ventricle: Systolic pressure was mildly to moderately increased. Estimated peak pressure was in the range of 40 mmHg. Left atrium: The atrium was moderately dilated. Right atrium: The atrium was dilated. Mitral valve: There was mild regurgitation. Aortic valve:  The valve was trileaflet. Pulmonic valve: There was mild regurgitation. Aorta, systemic arteries: The root exhibited mild dilatation. There was  mild dilatation of the ascending aorta.  ekg-2/2017  Sinus  Rhythm   WITHIN NORMAL LIMITS    SUMMARY:3/2017-echo  Left ventricle: Systolic function was normal. Ejection fraction was  estimated in the range of 60 % to 65 %. There were no regional wall motion  abnormalities. There was moderate concentric hypertrophy. Features were  consistent with a pseudonormal left ventricular filling pattern, with  concomitant abnormal relaxation and increased filling pressure (grade 2  diastolic dysfunction). Right ventricle: The ventricle was dilated. Left atrium: The atrium was severely dilated. LA volume index was 73.44  ml/mï¾². Right atrium: The atrium was dilated. Mitral valve: There was mild annular calcification. There was mild  regurgitation. Tricuspid valve: There was mild regurgitation. Pulmonic valve: There was mild regurgitation. Aorta, systemic arteries: The root exhibited upper limit of normal size. Cath-9/2017-cath  HEMODYNAMICS: LVEDP is 16. PCW mean of 7. PA 35/10/16. RV  35/6 (EDP). RA mean is 3. Aortic pressure 160/70. No gradient at  aortic valve at the time of pullback. Cardiac output by Miguel method is 7.8 L/minute and by thermodilution is  5.6 L/minute. Cardiac index by Miguel is 3.99 and by thermodilution is 2.9. CONCLUSION:9/2017  1. Normal pulmonary artery pressure. 2. High normal left ventricular end-diastolic pressure without any aortic  valve gradient. 3. One-vessel coronary artery disease with 70% proximal and ostial  stenosis of second diagonal artery without any flow limitation  angiographically. 4. Normal left ventricular wall motion and ejection fraction in the range  of 60% to 65%. Interpretation Summary 7/2018  · Calculated left ventricular ejection fraction is 65%. Left ventricular mild concentric hypertrophy observed. Normal left ventricular wall motion, no regional wall motion abnormality noted. Mild (grade 1) left ventricular diastolic dysfunction. · Left atrial cavity size is moderately dilated. · Right ventricular cavity size is mildly dilated  · Right atrial cavity size is mildly dilated. · Mild aortic valve sclerosis. · Mild mitral valve regurgitation. · Mild tricuspid valve regurgitation is present. Pulmonary arterial systolic pressure is 35 mmHg. There is no evidence of pulmonary hypertension. · Mild aortic root and ascending aorta dilatation.           Assessment         ICD-10-CM ICD-9-CM    1. Coronary artery disease involving native coronary artery of native heart without angina pectoris I25.10 414.01     Stable continue current medical management   2. Essential hypertension I10 401.9     Mildly elevated monitor   3. ESRD (end stage renal disease) ([facility]) N18.6 585.6     Stable monitor   4. Hypercholesterolemia E78.00 272.0     Continue treatment lab with PCP   5. Hypertensive left ventricular hypertrophy, without heart failure I11.9 402.90     Continue treatment monitor     10/2017  Ok [facility]  2019  Cardiac status stable. Not considered renal transplant candidate due to age  8/2019  Cardiac status stable. Continue current treatment. 2/2020  Cardiac status stable. Monitor blood pressure during dialysis  No orders of the defined types were placed in this encounter. Follow-up and Dispositions    · Return in about 6 months (around 8/20/2020).

## 2020-02-20 NOTE — PROGRESS NOTES
1. Have you been to the ER, urgent care clinic since your last visit? Hospitalized since your last visit? No    2. Have you seen or consulted any other health care providers outside of the [address] [place] [address] since your last visit? Include any pap smears or colon screening.  No

## 2020-11-12 ENCOUNTER — OFFICE VISIT (OUTPATIENT)
Dept: CARDIOLOGY CLINIC | Age: 75
End: 2020-11-12
Payer: MEDICARE

## 2020-11-12 VITALS
WEIGHT: 190.2 LBS | BODY MASS INDEX: 28.17 KG/M2 | OXYGEN SATURATION: 99 % | HEART RATE: 63 BPM | TEMPERATURE: 97.9 F | DIASTOLIC BLOOD PRESSURE: 67 MMHG | HEIGHT: 69 IN | SYSTOLIC BLOOD PRESSURE: 119 MMHG

## 2020-11-12 DIAGNOSIS — I25.10 CORONARY ARTERY DISEASE INVOLVING NATIVE CORONARY ARTERY OF NATIVE HEART WITHOUT ANGINA PECTORIS: Primary | ICD-10-CM

## 2020-11-12 DIAGNOSIS — I10 ESSENTIAL HYPERTENSION: ICD-10-CM

## 2020-11-12 DIAGNOSIS — N18.6 ESRD (END STAGE RENAL DISEASE) (HCC): ICD-10-CM

## 2020-11-12 DIAGNOSIS — I11.9 HYPERTENSIVE LEFT VENTRICULAR HYPERTROPHY, WITHOUT HEART FAILURE: ICD-10-CM

## 2020-11-12 DIAGNOSIS — E78.00 HYPERCHOLESTEROLEMIA: ICD-10-CM

## 2020-11-12 PROCEDURE — G9231 DOC ESRD DIA TRANS PREG: HCPCS | Performed by: INTERNAL MEDICINE

## 2020-11-12 PROCEDURE — G8510 SCR DEP NEG, NO PLAN REQD: HCPCS | Performed by: INTERNAL MEDICINE

## 2020-11-12 PROCEDURE — 99214 OFFICE O/P EST MOD 30 MIN: CPT | Performed by: INTERNAL MEDICINE

## 2020-11-12 PROCEDURE — 3017F COLORECTAL CA SCREEN DOC REV: CPT | Performed by: INTERNAL MEDICINE

## 2020-11-12 PROCEDURE — 1101F PT FALLS ASSESS-DOCD LE1/YR: CPT | Performed by: INTERNAL MEDICINE

## 2020-11-12 PROCEDURE — G8536 NO DOC ELDER MAL SCRN: HCPCS | Performed by: INTERNAL MEDICINE

## 2020-11-12 PROCEDURE — G8427 DOCREV CUR MEDS BY ELIG CLIN: HCPCS | Performed by: INTERNAL MEDICINE

## 2020-11-12 PROCEDURE — G8419 CALC BMI OUT NRM PARAM NOF/U: HCPCS | Performed by: INTERNAL MEDICINE

## 2020-11-12 RX ORDER — LOSARTAN POTASSIUM 50 MG/1
TABLET ORAL DAILY
COMMUNITY
End: 2022-08-17

## 2020-11-12 RX ORDER — ATORVASTATIN CALCIUM 80 MG/1
80 TABLET, FILM COATED ORAL DAILY
COMMUNITY
End: 2022-05-12 | Stop reason: SDUPTHER

## 2020-11-12 RX ORDER — LIDOCAINE AND PRILOCAINE 25; 25 MG/G; MG/G
CREAM TOPICAL AS NEEDED
COMMUNITY
End: 2021-11-11

## 2020-11-12 NOTE — PROGRESS NOTES
HISTORY OF PRESENT ILLNESS  General 1981 Latrell Young is a 70 y.o. male. Hypertension   The history is provided by the patient. This is a chronic problem. The problem occurs constantly. The problem has not changed since onset. Pertinent negatives include no chest pain, no abdominal pain, no headaches and no shortness of breath. Nothing aggravates the symptoms. Cholesterol Problem   The history is provided by the patient. This is a chronic problem. The problem occurs constantly. Pertinent negatives include no chest pain, no abdominal pain, no headaches and no shortness of breath. Shortness of Breath   The history is provided by the patient. This is a recurrent problem. The problem occurs frequently. The problem has been gradually worsening. Pertinent negatives include no fever, no headaches, no cough, no sputum production, no hemoptysis, no wheezing, no PND, no orthopnea, no chest pain, no vomiting, no abdominal pain, no rash, no leg swelling and no claudication. Precipitated by: exertion,dialysis. Dizziness   The history is provided by the patient. This is a recurrent problem. The problem occurs rarely. Pertinent negatives include no chest pain, no abdominal pain, no headaches and no shortness of breath. Review of Systems   Constitutional: Negative for chills and fever. HENT: Negative for nosebleeds. Eyes: Negative for blurred vision and double vision. Respiratory: Negative for cough, hemoptysis, sputum production, shortness of breath and wheezing. Cardiovascular: Negative for chest pain, palpitations, orthopnea, claudication, leg swelling and PND. Gastrointestinal: Negative for abdominal pain, heartburn, nausea and vomiting. Musculoskeletal: Negative for myalgias. Skin: Negative for rash. Neurological: Positive for dizziness. Negative for weakness and headaches. Endo/Heme/Allergies: Does not bruise/bleed easily.      Family History   Problem Relation Age of Onset    Hypertension Sister  Hypertension Brother     Hypertension Father        Past Medical History:   Diagnosis Date    Anemia in chronic kidney disease     Chronic kidney disease     History of kidney transplant x3    Chronic kidney disease     HD- M-W-F    History of echocardiogram 2015    EF 55-60%. No WMA. Mod conc LVH. RVSP 40  mmHg. Mod LAE.  JCARLOS. Mild MR. Mild PI. Mild AoRE. Mild ascend'g Ao dilatation.  HTN (hypertension)     Hypercholesterolemia     Sleep apnea     CPAP       Past Surgical History:   Procedure Laterality Date    COLONOSCOPY N/A 2018    COLONOSCOPY performed by Sanjuanita Valentine MD at [address] HX PARTIAL THYROIDECTOMY      HX RENAL TRANSPLANT      X3    HX SPLENECTOMY      VASCULAR SURGERY PROCEDURE UNLIST      LEFT ARM FISTULA       Social History     Tobacco Use    Smoking status: Former Smoker     Packs/day: 0.50     Years: 10.00     Pack years: 5.00     Last attempt to quit: 1975     Years since quittin.8    Smokeless tobacco: Never Used   Substance Use Topics    Alcohol use: No       No Known Allergies    Prior to Admission medications    Medication Sig Start Date End Date Taking? Authorizing Provider   atorvastatin (LIPITOR) 40 mg tablet Take  by mouth daily. Yes Provider, Historical   lidocaine-prilocaine (EMLA) topical cream Apply  to affected area as needed for Pain. Yes Provider, Historical   losartan (COZAAR) 50 mg tablet Take  by mouth daily. Yes Provider, Historical   tamsulosin (FLOMAX) 0.4 mg capsule Take 0.4 mg by mouth daily. Yes Provider, Historical   ferric citrate (AURYXIA) 210 mg iron tablet Take 420 mg by mouth three (3) times daily (with meals). Yes Provider, Historical   aspirin delayed-release 81 mg tablet Take 81 mg by mouth daily. Yes Provider, Historical   Cetirizine (ZYRTEC) 10 mg cap Take  by mouth daily. Yes Provider, Historical   CHOLECALCIFEROL, VITAMIN D3, (VITAMIN D3 PO) Take  by mouth.    Yes Provider, Historical multivitamin (ONE A DAY) tablet Take 1 tablet by mouth daily. Yes Provider, Historical   carvedilol (COREG) 25 mg tablet Take 1 tablet by mouth two (2) times a day. 1/6/15  Yes Maria Elena Reis MD         Visit Vitals  /67 (BP 1 Location: Left arm, BP Patient Position: Sitting)   Pulse 63   Temp 97.9 °F (36.6 °C) (Temporal)   Ht 5' 9\" (1.753 m)   Wt 86.3 kg (190 lb 3.2 oz)   SpO2 99%   BMI 28.09 kg/m²         Physical Exam   Constitutional: He is oriented to person, place, and time. He appears well-developed and well-nourished. HENT:   Head: Normocephalic and atraumatic. Eyes: Conjunctivae are normal.   Neck: Neck supple. No JVD present. No tracheal deviation present. No thyromegaly present. Cardiovascular: Normal rate and regular rhythm. Exam reveals no gallop and no friction rub. Murmur heard. Holosystolic murmur is present at the lower left sternal border. Pulmonary/Chest: No respiratory distress. He has no wheezes. He has no rales. He exhibits no tenderness. Abdominal: Soft. There is no abdominal tenderness. Musculoskeletal:         General: No edema. Neurological: He is alert and oriented to person, place, and time. Skin: Skin is warm and dry. Psychiatric: He has a normal mood and affect. Mr. Romero Aguiar has a reminder for a \"due or due soon\" health maintenance. I have asked that he contact his primary care provider for follow-up on this health maintenance. [facility]:[id number]  Left ventricle: Systolic function was normal by EF (biplane method of  disks). Ejection fraction was estimated in the range of 55 % to 60 %. No  obvious wall motion abnormalities identified in the views obtained. Wall  thickness was moderately to markedly increased. There was moderate  concentric hypertrophy. Right ventricle: Systolic pressure was mildly to moderately increased. Estimated peak pressure was in the range of 40 mmHg. Left atrium: The atrium was moderately dilated. Right atrium:  The atrium was dilated. Mitral valve: There was mild regurgitation. Aortic valve: The valve was trileaflet. Pulmonic valve: There was mild regurgitation. Aorta, systemic arteries: The root exhibited mild dilatation. There was  mild dilatation of the ascending aorta.  ekg-2/2017  Sinus  Rhythm   WITHIN NORMAL LIMITS    SUMMARY:3/2017-echo  Left ventricle: Systolic function was normal. Ejection fraction was  estimated in the range of 60 % to 65 %. There were no regional wall motion  abnormalities. There was moderate concentric hypertrophy. Features were  consistent with a pseudonormal left ventricular filling pattern, with  concomitant abnormal relaxation and increased filling pressure (grade 2  diastolic dysfunction). Right ventricle: The ventricle was dilated. Left atrium: The atrium was severely dilated. LA volume index was 73.44  ml/mï¾². Right atrium: The atrium was dilated. Mitral valve: There was mild annular calcification. There was mild  regurgitation. Tricuspid valve: There was mild regurgitation. Pulmonic valve: There was mild regurgitation. Aorta, systemic arteries: The root exhibited upper limit of normal size. Cath-9/2017-cath  HEMODYNAMICS: LVEDP is 16. PCW mean of 7. PA 35/10/16. RV  35/6 (EDP). RA mean is 3. Aortic pressure 160/70. No gradient at  aortic valve at the time of pullback. Cardiac output by Miguel method is 7.8 L/minute and by thermodilution is  5.6 L/minute. Cardiac index by Miguel is 3.99 and by thermodilution is 2.9. CONCLUSION:9/2017  1. Normal pulmonary artery pressure. 2. High normal left ventricular end-diastolic pressure without any aortic  valve gradient. 3. One-vessel coronary artery disease with 70% proximal and ostial  stenosis of second diagonal artery without any flow limitation  angiographically. 4. Normal left ventricular wall motion and ejection fraction in the range  of 60% to 65%.   Interpretation Summary 7/2018  · Calculated left ventricular ejection fraction is 65%. Left ventricular mild concentric hypertrophy observed. Normal left ventricular wall motion, no regional wall motion abnormality noted. Mild (grade 1) left ventricular diastolic dysfunction. · Left atrial cavity size is moderately dilated. · Right ventricular cavity size is mildly dilated  · Right atrial cavity size is mildly dilated. · Mild aortic valve sclerosis. · Mild mitral valve regurgitation. · Mild tricuspid valve regurgitation is present. Pulmonary arterial systolic pressure is 35 mmHg. There is no evidence of pulmonary hypertension. · Mild aortic root and ascending aorta dilatation.           Assessment         ICD-10-CM ICD-9-CM    1. Coronary artery disease involving native coronary artery of native heart without angina pectoris  I25.10 414.01     Stable continue current medical management monitor   2. Essential hypertension  I10 401.9     Controlled continue treatment   3. ESRD (end stage renal disease) ([facility] [address]  N18.6 585.6     Tolerating well   4. Hypertensive left ventricular hypertrophy, without heart failure  I11.9 402.90     Stable monitor   5. Hypercholesterolemia  E78.00 272.0     Now on atorvastatin. Labs done at dialysis     10/2017  [facility] for renal transplant from [facility] point  2019  Cardiac status stable. Not considered renal transplant candidate due to age  8/2019  Cardiac status stable. Continue current treatment. 2/2020  Cardiac status stable. Monitor blood pressure during dialysis  No orders of the defined types were placed in this encounter. Follow-up and Dispositions    · Return in about 6 months (around 5/12/2021).

## 2021-05-13 ENCOUNTER — OFFICE VISIT (OUTPATIENT)
Dept: CARDIOLOGY CLINIC | Age: 76
End: 2021-05-13
Payer: MEDICARE

## 2021-05-13 VITALS
WEIGHT: 197 LBS | BODY MASS INDEX: 29.18 KG/M2 | TEMPERATURE: 97.5 F | SYSTOLIC BLOOD PRESSURE: 144 MMHG | HEART RATE: 66 BPM | HEIGHT: 69 IN | DIASTOLIC BLOOD PRESSURE: 83 MMHG | OXYGEN SATURATION: 100 %

## 2021-05-13 DIAGNOSIS — I10 ESSENTIAL HYPERTENSION: ICD-10-CM

## 2021-05-13 DIAGNOSIS — I11.9 HYPERTENSIVE LEFT VENTRICULAR HYPERTROPHY, WITHOUT HEART FAILURE: ICD-10-CM

## 2021-05-13 DIAGNOSIS — I25.10 CORONARY ARTERY DISEASE INVOLVING NATIVE CORONARY ARTERY OF NATIVE HEART WITHOUT ANGINA PECTORIS: Primary | ICD-10-CM

## 2021-05-13 DIAGNOSIS — N18.6 ESRD (END STAGE RENAL DISEASE) (HCC): ICD-10-CM

## 2021-05-13 DIAGNOSIS — E78.00 HYPERCHOLESTEROLEMIA: ICD-10-CM

## 2021-05-13 PROCEDURE — G8419 CALC BMI OUT NRM PARAM NOF/U: HCPCS | Performed by: INTERNAL MEDICINE

## 2021-05-13 PROCEDURE — G8536 NO DOC ELDER MAL SCRN: HCPCS | Performed by: INTERNAL MEDICINE

## 2021-05-13 PROCEDURE — G8427 DOCREV CUR MEDS BY ELIG CLIN: HCPCS | Performed by: INTERNAL MEDICINE

## 2021-05-13 PROCEDURE — 3017F COLORECTAL CA SCREEN DOC REV: CPT | Performed by: INTERNAL MEDICINE

## 2021-05-13 PROCEDURE — 99214 OFFICE O/P EST MOD 30 MIN: CPT | Performed by: INTERNAL MEDICINE

## 2021-05-13 PROCEDURE — 1101F PT FALLS ASSESS-DOCD LE1/YR: CPT | Performed by: INTERNAL MEDICINE

## 2021-05-13 PROCEDURE — G8432 DEP SCR NOT DOC, RNG: HCPCS | Performed by: INTERNAL MEDICINE

## 2021-05-13 PROCEDURE — G9231 DOC ESRD DIA TRANS PREG: HCPCS | Performed by: INTERNAL MEDICINE

## 2021-05-13 NOTE — PROGRESS NOTES
HISTORY OF PRESENT ILLNESS  [facility] is a 70 y.o. male. 5/2021  Patient seen today for follow-up. Hypertension  The history is provided by the patient. This is a chronic problem. The problem occurs constantly. The problem has not changed since onset. Pertinent negatives include no chest pain, no abdominal pain, no headaches and no shortness of breath. Nothing aggravates the symptoms. Cholesterol Problem  The history is provided by the patient. This is a chronic problem. The problem occurs constantly. Pertinent negatives include no chest pain, no abdominal pain, no headaches and no shortness of breath. Shortness of Breath  The history is provided by the patient. This is a recurrent problem. The problem occurs frequently. The problem has been gradually worsening. Pertinent negatives include no fever, no headaches, no cough, no sputum production, no hemoptysis, no wheezing, no PND, no orthopnea, no chest pain, no vomiting, no abdominal pain, no rash, no leg swelling and no claudication. Precipitated by: exertion,dialysis. Dizziness  The history is provided by the patient. This is a recurrent problem. The problem occurs rarely. Pertinent negatives include no chest pain, no abdominal pain, no headaches and no shortness of breath. Follow-up  Pertinent negatives include no chest pain, no abdominal pain, no headaches and no shortness of breath. Review of Systems   Constitutional: Negative for chills and fever. HENT: Negative for nosebleeds. Eyes: Negative for blurred vision and double vision. Respiratory: Negative for cough, hemoptysis, sputum production, shortness of breath and wheezing. Cardiovascular: Negative for chest pain, palpitations, orthopnea, claudication, leg swelling and PND. Gastrointestinal: Negative for abdominal pain, heartburn, nausea and vomiting. Musculoskeletal: Negative for myalgias. Skin: Negative for rash. Neurological: Positive for dizziness.  Negative for weakness and headaches. Endo/Heme/Allergies: Does not bruise/bleed easily. Family History   Problem Relation Age of Onset    Hypertension Sister     Hypertension Brother     Hypertension Father        Past Medical History:   Diagnosis Date    Anemia in chronic kidney disease     Chronic kidney disease     History of kidney transplant x3    Chronic kidney disease     HD- M-W-F    History of echocardiogram 2015    EF 55-60%. No WMA. Mod conc LVH. RVSP 40  mmHg. Mod LAE.  JCARLOS. Mild MR. Mild PI. Mild AoRE. Mild ascend'g Ao dilatation.  HTN (hypertension)     Hypercholesterolemia     Sleep apnea     CPAP       Past Surgical History:   Procedure Laterality Date    COLONOSCOPY N/A 2018    COLONOSCOPY performed by Barbara Knight MD at [facility]    HX PARTIAL THYROIDECTOMY      HX RENAL TRANSPLANT      X3    HX SPLENECTOMY      VASCULAR SURGERY PROCEDURE UNLIST      LEFT ARM FISTULA       Social History     Tobacco Use    Smoking status: Former Smoker     Packs/day: 0.50     Years: 10.00     Pack years: 5.00     Quit date: 1975     Years since quittin.3    Smokeless tobacco: Never Used   Substance Use Topics    Alcohol use: No       No Known Allergies    Prior to Admission medications    Medication Sig Start Date End Date Taking? Authorizing Provider   atorvastatin (LIPITOR) 40 mg tablet Take 80 mg by mouth daily. Yes Provider, Historical   lidocaine-prilocaine (EMLA) topical cream Apply  to affected area as needed for Pain. Yes Provider, Historical   losartan (COZAAR) 50 mg tablet Take  by mouth daily. Yes Provider, Historical   tamsulosin (FLOMAX) 0.4 mg capsule Take 0.4 mg by mouth daily. Yes Provider, Historical   ferric citrate (AURYXIA) 210 mg iron tablet Take 420 mg by mouth three (3) times daily (with meals). Yes Provider, Historical   aspirin delayed-release 81 mg tablet Take 81 mg by mouth daily.    Yes Provider, Historical   Cetirizine (ZYRTEC) 10 mg cap Take  by mouth daily. Yes Provider, Historical   CHOLECALCIFEROL, VITAMIN D3, (VITAMIN D3 PO) Take  by mouth. Yes Provider, Historical   multivitamin (ONE A DAY) tablet Take 1 tablet by mouth daily. Yes Provider, Historical   carvedilol (COREG) 25 mg tablet Take 1 tablet by mouth two (2) times a day. 1/6/15  Yes Adán Phoenix MD         Visit Vitals  BP (!) 144/83 (BP 1 Location: Right arm, BP Patient Position: Sitting, BP Cuff Size: Adult)   Pulse 66   Temp 97.5 °F (36.4 °C) (Temporal)   Ht 5' 9\" (1.753 m)   Wt 89.4 kg (197 lb)   SpO2 100%   BMI 29.09 kg/m²         Physical Exam   Constitutional: He is oriented to person, place, and time. He appears well-developed and well-nourished. HENT:   Head: Normocephalic and atraumatic. Eyes: Conjunctivae are normal.   Neck: Neck supple. No JVD present. No tracheal deviation present. No thyromegaly present. Cardiovascular: Normal rate and regular rhythm. Exam reveals no gallop and no friction rub. Murmur heard. Holosystolic murmur is present at the lower left sternal border. Pulmonary/Chest: No respiratory distress. He has no wheezes. He has no rales. He exhibits no tenderness. Abdominal: Soft. There is no abdominal tenderness. Musculoskeletal:         General: No edema. Neurological: He is alert and oriented to person, place, and time. Skin: Skin is warm and dry. Psychiatric: He has a normal mood and affect. Mr. Shyla Osorio has a reminder for a \"due or due soon\" health maintenance. I have asked that he contact his primary care provider for follow-up on this health maintenance. [facility]70  Left ventricle: Systolic function was normal by EF (biplane method of  disks). Ejection fraction was estimated in the range of 55 % to 60 %. No  obvious wall motion abnormalities identified in the views obtained. Wall  thickness was moderately to markedly increased. There was moderate  concentric hypertrophy.     Right ventricle: Systolic pressure was mildly to moderately increased. Estimated peak pressure was in the range of 40 mmHg. Left atrium: The atrium was moderately dilated. Right atrium: The atrium was dilated. Mitral valve: There was mild regurgitation. Aortic valve: The valve was trileaflet. Pulmonic valve: There was mild regurgitation. Aorta, systemic arteries: The root exhibited mild dilatation. There was  mild dilatation of the ascending aorta.  ekg-2/2017  Sinus  Rhythm   WITHIN NORMAL LIMITS    SUMMARY:3/2017-echo  Left ventricle: Systolic function was normal. Ejection fraction was  estimated in the range of 60 % to 65 %. There were no regional wall motion  abnormalities. There was moderate concentric hypertrophy. Features were  consistent with a pseudonormal left ventricular filling pattern, with  concomitant abnormal relaxation and increased filling pressure (grade 2  diastolic dysfunction). Right ventricle: The ventricle was dilated. Left atrium: The atrium was severely dilated. LA volume index was 73.44  ml/mï¾². Right atrium: The atrium was dilated. Mitral valve: There was mild annular calcification. There was mild  regurgitation. Tricuspid valve: There was mild regurgitation. Pulmonic valve: There was mild regurgitation. Aorta, systemic arteries: The root exhibited upper limit of normal size. Cath-9/2017-cath  HEMODYNAMICS: LVEDP is 16. PCW mean of 7. PA 35/10/16. RV  35/6 (EDP). RA mean is 3. Aortic pressure 160/70. No gradient at  aortic valve at the time of pullback. Cardiac output by Miguel method is 7.8 L/minute and by thermodilution is  5.6 L/minute. Cardiac index by Miguel is 3.99 and by thermodilution is 2.9. CONCLUSION:9/2017  1. Normal pulmonary artery pressure. 2. High normal left ventricular end-diastolic pressure without any aortic  valve gradient.   3. One-vessel coronary artery disease with 70% proximal and ostial  stenosis of second diagonal artery without any flow limitation  angiographically. 4. Normal left ventricular wall motion and ejection fraction in the range  of 60% to 65%. Interpretation Summary 7/2018  · Calculated left ventricular ejection fraction is 65%. Left ventricular mild concentric hypertrophy observed. Normal left ventricular wall motion, no regional wall motion abnormality noted. Mild (grade 1) left ventricular diastolic dysfunction. · Left atrial cavity size is moderately dilated. · Right ventricular cavity size is mildly dilated  · Right atrial cavity size is mildly dilated. · Mild aortic valve sclerosis. · Mild mitral valve regurgitation. · Mild tricuspid valve regurgitation is present. Pulmonary arterial systolic pressure is 35 mmHg. There is no evidence of pulmonary hypertension. · Mild aortic root and ascending aorta dilatation.           Assessment         ICD-10-CM ICD-9-CM    1. Coronary artery disease involving native coronary artery of native heart without angina pectoris  I25.10 414.01     Stable continue current medical management monitor   2. Essential hypertension  I10 401.9     Continue treatment stable monitor   3. ESRD (end stage renal disease) ([facility])  N18.6 585.6     Stable continue monitoring with renal   4. Hypertensive left ventricular hypertrophy, without heart failure  I11.9 402.90     Continue treatment monitor stable   5. Hypercholesterolemia  E78.00 272.0     Continue treatment lab with PCP     10/2017  Ok for renal transplant from [facility] point  2019  Cardiac status stable. Not considered renal transplant candidate due to age  8/2019  Cardiac status stable. Continue current treatment. 2/2020  Cardiac status stable. Monitor blood pressure during dialysis  5/2021  Stable cardiac status. Mildly elevated blood pressure continue monitoring. Continue aspirin and statin. Lab with PCP        No orders of the defined types were placed in this encounter.       Follow-up and Dispositions    · Return in about 6 months (around 11/13/2021).

## 2021-05-13 NOTE — PROGRESS NOTES
1. Have you been to the ER, urgent care clinic since your last visit? Hospitalized since your last visit? No    2. Have you seen or consulted any other health care providers outside of the [address] [place] [address] since your last visit? Include any pap smears or colon screening.  Yes Where: Nephrology/PCP Reason for visit: Routine Visits

## 2021-11-11 ENCOUNTER — OFFICE VISIT (OUTPATIENT)
Dept: CARDIOLOGY CLINIC | Age: 76
End: 2021-11-11
Payer: MEDICARE

## 2021-11-11 VITALS
BODY MASS INDEX: 28.58 KG/M2 | WEIGHT: 193 LBS | HEIGHT: 69 IN | HEART RATE: 63 BPM | SYSTOLIC BLOOD PRESSURE: 144 MMHG | DIASTOLIC BLOOD PRESSURE: 70 MMHG | OXYGEN SATURATION: 97 %

## 2021-11-11 DIAGNOSIS — I25.10 CORONARY ARTERY DISEASE INVOLVING NATIVE CORONARY ARTERY OF NATIVE HEART WITHOUT ANGINA PECTORIS: Primary | ICD-10-CM

## 2021-11-11 DIAGNOSIS — I10 ESSENTIAL HYPERTENSION: ICD-10-CM

## 2021-11-11 DIAGNOSIS — I11.9 HYPERTENSIVE LEFT VENTRICULAR HYPERTROPHY, WITHOUT HEART FAILURE: ICD-10-CM

## 2021-11-11 DIAGNOSIS — N18.6 ESRD (END STAGE RENAL DISEASE) (HCC): ICD-10-CM

## 2021-11-11 DIAGNOSIS — E78.00 HYPERCHOLESTEROLEMIA: ICD-10-CM

## 2021-11-11 PROCEDURE — G8432 DEP SCR NOT DOC, RNG: HCPCS | Performed by: INTERNAL MEDICINE

## 2021-11-11 PROCEDURE — 99214 OFFICE O/P EST MOD 30 MIN: CPT | Performed by: INTERNAL MEDICINE

## 2021-11-11 PROCEDURE — G8427 DOCREV CUR MEDS BY ELIG CLIN: HCPCS | Performed by: INTERNAL MEDICINE

## 2021-11-11 PROCEDURE — G8536 NO DOC ELDER MAL SCRN: HCPCS | Performed by: INTERNAL MEDICINE

## 2021-11-11 PROCEDURE — G9231 DOC ESRD DIA TRANS PREG: HCPCS | Performed by: INTERNAL MEDICINE

## 2021-11-11 PROCEDURE — 3017F COLORECTAL CA SCREEN DOC REV: CPT | Performed by: INTERNAL MEDICINE

## 2021-11-11 PROCEDURE — G8419 CALC BMI OUT NRM PARAM NOF/U: HCPCS | Performed by: INTERNAL MEDICINE

## 2021-11-11 PROCEDURE — 1101F PT FALLS ASSESS-DOCD LE1/YR: CPT | Performed by: INTERNAL MEDICINE

## 2021-11-11 NOTE — PROGRESS NOTES
1. Have you been to the ER, urgent care clinic since your last visit? Hospitalized since your last visit? No    2. Have you seen or consulted any other health care providers outside of the [address] [place] [address] since your last visit? Include any pap smears or colon screening. Yes Where: [address]  doctor, Dr. Irais Valencia PCP for routine visit.

## 2021-11-11 NOTE — PROGRESS NOTES
HISTORY OF PRESENT ILLNESS  General Carole Borges is a 78 y.o. male. 5/2021  Patient seen today for follow-up. Follow-up  Pertinent negatives include no chest pain, no abdominal pain, no headaches and no shortness of breath. Hypertension  The history is provided by the patient. This is a chronic problem. The problem occurs constantly. The problem has not changed since onset. Pertinent negatives include no chest pain, no abdominal pain, no headaches and no shortness of breath. Nothing aggravates the symptoms. Cholesterol Problem  The history is provided by the patient. This is a chronic problem. The problem occurs constantly. Pertinent negatives include no chest pain, no abdominal pain, no headaches and no shortness of breath. Shortness of Breath  The history is provided by the patient. This is a recurrent problem. The problem occurs frequently. The problem has been gradually worsening. Pertinent negatives include no fever, no headaches, no cough, no sputum production, no hemoptysis, no wheezing, no PND, no orthopnea, no chest pain, no vomiting, no abdominal pain, no rash, no leg swelling and no claudication. Precipitated by: exertion,dialysis. Dizziness  The history is provided by the patient. This is a recurrent problem. The problem occurs rarely. Pertinent negatives include no chest pain, no abdominal pain, no headaches and no shortness of breath. Review of Systems   Constitutional: Negative for chills and fever. HENT: Negative for nosebleeds. Eyes: Negative for blurred vision and double vision. Respiratory: Negative for cough, hemoptysis, sputum production, shortness of breath and wheezing. Cardiovascular: Negative for chest pain, palpitations, orthopnea, claudication, leg swelling and PND. Gastrointestinal: Negative for abdominal pain, heartburn, nausea and vomiting. Musculoskeletal: Negative for myalgias. Skin: Negative for rash. Neurological: Positive for dizziness.  Negative for weakness and headaches. Endo/Heme/Allergies: Does not bruise/bleed easily. Family History   Problem Relation Age of Onset    Hypertension Sister     Hypertension Brother     Hypertension Father        Past Medical History:   Diagnosis Date    Anemia in chronic kidney disease     Chronic kidney disease     History of kidney transplant x3    Chronic kidney disease     HD- M-W-F    History of echocardiogram 2015    EF 55-60%. No WMA. Mod conc LVH. RVSP 40  mmHg. Mod LAE.  JCARLOS. Mild MR. Mild PI. Mild AoRE. Mild ascend'g Ao dilatation.  HTN (hypertension)     Hypercholesterolemia     Sleep apnea     CPAP       Past Surgical History:   Procedure Laterality Date    COLONOSCOPY N/A 2018    COLONOSCOPY performed by Dominic Vinson MD at [facility]COPY    HX PARTIAL THYROIDECTOMY      HX RENAL TRANSPLANT      X3    HX SPLENECTOMY      VASCULAR SURGERY PROCEDURE UNLIST      LEFT ARM FISTULA       Social History     Tobacco Use    Smoking status: Former Smoker     Packs/day: 0.50     Years: 10.00     Pack years: 5.00     Quit date: 1975     Years since quittin.8    Smokeless tobacco: Never Used   Substance Use Topics    Alcohol use: No       No Known Allergies    Prior to Admission medications    Medication Sig Start Date End Date Taking? Authorizing Provider   atorvastatin (LIPITOR) 40 mg tablet Take 80 mg by mouth daily. Yes Provider, Historical   losartan (COZAAR) 50 mg tablet Take  by mouth daily. Yes Provider, Historical   tamsulosin (FLOMAX) 0.4 mg capsule Take 0.4 mg by mouth daily. Yes Provider, Historical   ferric citrate (AURYXIA) 210 mg iron tablet Take 420 mg by mouth three (3) times daily (with meals). Yes Provider, Historical   aspirin delayed-release 81 mg tablet Take 81 mg by mouth daily. Yes Provider, Historical   Cetirizine (ZYRTEC) 10 mg cap Take  by mouth daily.    Yes Provider, Historical   CHOLECALCIFEROL, VITAMIN D3, (VITAMIN D3 PO) Take  by mouth.   Yes Provider, Historical   multivitamin (ONE A DAY) tablet Take 1 tablet by mouth daily. Yes Provider, Historical   carvedilol (COREG) 25 mg tablet Take 1 tablet by mouth two (2) times a day. 1/6/15  Yes Mitch Meyer MD         Visit Vitals  BP (!) 144/70 (BP 1 Location: Right upper arm, BP Patient Position: Sitting, BP Cuff Size: Adult)   Pulse 63   Ht 5' 9\" (1.753 m)   Wt 87.5 kg (193 lb)   SpO2 97%   BMI 28.50 kg/m²         Physical Exam  Constitutional:       Appearance: He is well-developed. HENT:      Head: Normocephalic and atraumatic. Eyes:      Conjunctiva/sclera: Conjunctivae normal.   Neck:      Thyroid: No thyromegaly. Vascular: No JVD. Trachea: No tracheal deviation. Cardiovascular:      Rate and Rhythm: Normal rate and regular rhythm. Heart sounds: Murmur heard. Holosystolic murmur is present at the lower left sternal border. No friction rub. No gallop. Pulmonary:      Effort: No respiratory distress. Breath sounds: No wheezing or rales. Chest:      Chest wall: No tenderness. Abdominal:      Palpations: Abdomen is soft. Tenderness: There is no abdominal tenderness. Musculoskeletal:      Cervical back: Neck supple. Skin:     General: Skin is warm and dry. Neurological:      Mental Status: He is alert and oriented to person, place, and time. Mr. Clarissa Dodd has a reminder for a \"due or due soon\" health maintenance. I have asked that he contact his primary care provider for follow-up on this health maintenance. [facility]:[id number]  Left ventricle: Systolic function was normal by EF (biplane method of  disks). Ejection fraction was estimated in the range of 55 % to 60 %. No  obvious wall motion abnormalities identified in the views obtained. Wall  thickness was moderately to markedly increased. There was moderate  concentric hypertrophy. Right ventricle: Systolic pressure was mildly to moderately increased.   Estimated peak pressure was in the range of 40 mmHg. Left atrium: The atrium was moderately dilated. Right atrium: The atrium was dilated. Mitral valve: There was mild regurgitation. Aortic valve: The valve was trileaflet. Pulmonic valve: There was mild regurgitation. Aorta, systemic arteries: The root exhibited mild dilatation. There was  mild dilatation of the ascending aorta.  ekg-2/2017  Sinus  Rhythm   WITHIN NORMAL LIMITS    SUMMARY:3/2017-echo  Left ventricle: Systolic function was normal. Ejection fraction was  estimated in the range of 60 % to 65 %. There were no regional wall motion  abnormalities. There was moderate concentric hypertrophy. Features were  consistent with a pseudonormal left ventricular filling pattern, with  concomitant abnormal relaxation and increased filling pressure (grade 2  diastolic dysfunction). Right ventricle: The ventricle was dilated. Left atrium: The atrium was severely dilated. LA volume index was 73.44  ml/mï¾². Right atrium: The atrium was dilated. Mitral valve: There was mild annular calcification. There was mild  regurgitation. Tricuspid valve: There was mild regurgitation. Pulmonic valve: There was mild regurgitation. Aorta, systemic arteries: The root exhibited upper limit of normal size. Cath-9/2017-cath  HEMODYNAMICS: LVEDP is 16. PCW mean of 7. PA 35/10/16. RV  35/6 (EDP). RA mean is 3. Aortic pressure 160/70. No gradient at  aortic valve at the time of pullback. Cardiac output by Miguel method is 7.8 L/minute and by thermodilution is  5.6 L/minute. Cardiac index by Miguel is 3.99 and by thermodilution is 2.9. CONCLUSION:9/2017  1. Normal pulmonary artery pressure. 2. High normal left ventricular end-diastolic pressure without any aortic  valve gradient. 3. One-vessel coronary artery disease with 70% proximal and ostial  stenosis of second diagonal artery without any flow limitation  angiographically.   4. Normal left ventricular wall motion and ejection fraction in the range  of 60% to 65%. Interpretation Summary 7/2018  · Calculated left ventricular ejection fraction is 65%. Left ventricular mild concentric hypertrophy observed. Normal left ventricular wall motion, no regional wall motion abnormality noted. Mild (grade 1) left ventricular diastolic dysfunction. · Left atrial cavity size is moderately dilated. · Right ventricular cavity size is mildly dilated  · Right atrial cavity size is mildly dilated. · Mild aortic valve sclerosis. · Mild mitral valve regurgitation. · Mild tricuspid valve regurgitation is present. Pulmonary arterial systolic pressure is 35 mmHg. There is no evidence of pulmonary hypertension. · Mild aortic root and ascending aorta dilatation.           Assessment         ICD-10-CM ICD-9-CM    1. Coronary artery disease involving native coronary artery of native heart without angina pectoris  I25.10 414.01     Stable continue treatment monitor   2. Essential hypertension  I10 401.9     Stable continue current treatment   3. ESRD (end stage renal disease) ([address]  N18.6 585.6     Continue monitoring tolerating   4. Hypertensive left ventricular hypertrophy, without heart failure  I11.9 402.90     Stable monitor   5. Hypercholesterolemia  E78.00 272.0     Continue current treatment lab with PCP     10/2017  Ok for renal transplant from [facility]  2019  Cardiac status stable. Not considered renal transplant candidate due to age  8/2019  Cardiac status stable. Continue current treatment. 2/2020  Cardiac status stable. Monitor blood pressure during dialysis  5/2021  Stable cardiac status. Mildly elevated blood pressure continue monitoring. Continue aspirin and statin. Lab with PCP    11/2021  Cardiac status stable. Blood pressure mildly elevated continue to monitor dialysis patient which she is tolerating well. No orders of the defined types were placed in this encounter.       Follow-up and Dispositions    · Return in about 6 months (around 5/11/2022).

## 2022-01-14 NOTE — PROGRESS NOTES
[facility] Short Consult Note                  (Labs/Type & Cross/Portflush/Antibiotic/Non-Chemo injections)      Date: 2017    Name: Kunal Doherty    MRN: [id number]         : 1945    Treatment: Procrit      Mr. Kenia Young was assessed and education was provided. Pt arrived ambulatory for scheduled Procrit injections. Pt states he received a phone call from [address] [place] [address] for abnormal labs on Friday. Went to [facility] for elevated BUN/Creatinine. Pt states he is starting dialysis on this week. Mr. Sarahi Cook vitals were reviewed and patient was observed for 5 minutes prior to treatment. Visit Vitals    /79 (BP 1 Location: Right arm, BP Patient Position: Sitting)    Pulse 77    Temp 97.7 °F (36.5 °C)    Resp 18    SpO2 97%       Lab results were obtained and reviewed. CBC drawn from right AC X 1 attempt    Per results of labs, Procrit not indicated today. .    Mr. Anderson tolerated the infusion, and had no complaints. Mr. Kenia Young was discharged from [place] [address] in stable condition at 1540. He is to return on 2017  at 1500 for his next appointment, pt states he will no longer be coming for procrit once dialysis starts, call to Dr Kamari Rawls office, they will send over order to [place] all future appts.     Katherine Lazcano RN  2017  3:48 PM CC: Respiratory Distress/ PNA (12 Jan 2022 10:42)    HPI:  75 yo male former smoker with history of COPD on home O2, B/L carotid artery stenosis, laryngeal cancer (S/P chemo and RT), PEG tube, recent aspiration pneumonia (pseudomonas) treated at [facility][1] with zosyn/vanco for 7 days who presents from home after acute onset SOB. Yesterday had coughed up sputum followed by emesis. Today in acute respiratory distress. Patient came via EMS and initially placed on bipap 10/5/50% for spO2 of 80% but vomited again, was taken off and placed on HFNC at 50% now % SpO2. Patient with fever on arrival, leukocytosis and bilateral infiltrate on CT scan.  admitted for pneumonia    INTERVAL HPI/OVERNIGHT EVENTS:  No acute events overnight.  Patient seen and examined at bedside in no acute distress.  Patient currently satting well on 2 NC.  He is tolerating feeds without issue.  Patient having normal bowel movements andable to ambulate to bathroom.      Vital Signs Last 24 Hrs  T(C): 36.8 (14 Jan 2022 08:32), Max: 36.8 (14 Jan 2022 08:32)  T(F): 98.2 (14 Jan 2022 08:32), Max: 98.2 (14 Jan 2022 08:32)  HR: 71 (14 Jan 2022 08:32) (71 - 96)  BP: 95/45 (14 Jan 2022 08:32) (95/45 - 130/62)  RR: 18 (14 Jan 2022 08:32) (16 - 18)  SpO2: 98% (14 Jan 2022 08:32) (95% - 100%)    PHYSICAL EXAM:  General: in no acute distress  Eyes: PERRLA, EOMI; conjunctiva and sclera clear  Head: Normocephalic; atraumatic  ENMT: No nasal discharge; airway clear  Neck: Supple; non tender; no masses  Respiratory: NC in place, Diffuse ronchi and crackles improved from yesterday  Cardiovascular: Regular rate and rhythm. S1 and S2 Normal; No murmurs, gallops or rubs  Gastrointestinal: Soft non-tender non-distended; Normal bowel sounds  Genitourinary: No costovertebral angle tenderness  Extremities: Normal range of motion, No clubbing, cyanosis or edema  Vascular: Peripheral pulses palpable 2+ bilaterally  Neurological: Alert and oriented x4  Skin: Warm and dry. No acute rash  Lymph Nodes: No acute cervical adenopathy  Musculoskeletal: Normal gait, tone, without deformities  Psychiatric: Cooperative and appropriate    Labs:                        9.2    6.09  )-----------( 190      ( 14 Jan 2022 08:09 )             30.4     14 Jan 2022 08:09    140    |  98     |  35.0   ----------------------------<  168    4.4     |  28.0   |  0.92     Ca    9.0        14 Jan 2022 08:09  Phos  2.7       14 Jan 2022 08:09  Mg     1.8       14 Jan 2022 08:09    TPro  7.1    /  Alb  2.7    /  TBili  <0.2   /  DBili  x      /  AST  30     /  ALT  14     /  AlkPhos  56     13 Jan 2022 11:20    CAPILLARY BLOOD GLUCOSE  POCT Blood Glucose.: 107 mg/dL (14 Jan 2022 04:44)    LIVER FUNCTIONS - ( 13 Jan 2022 11:20 )  Alb: 2.7 g/dL / Pro: 7.1 g/dL / ALK PHOS: 56 U/L / ALT: 14 U/L / AST: 30 U/L / GGT: x           MEDICATIONS  (STANDING):  aspirin  chewable 81 milliGRAM(s) Oral daily  atorvastatin 40 milliGRAM(s) Oral at bedtime  bisacodyl Suppository 10 milliGRAM(s) Rectal at bedtime  cefepime   IVPB 2000 milliGRAM(s) IV Intermittent every 12 hours  cefepime   IVPB      levothyroxine 112 MICROGram(s) Oral daily  lidocaine   4% Patch 1 Patch Transdermal daily  lisinopril 10 milliGRAM(s) Enteral Tube daily  metoprolol tartrate 12.5 milliGRAM(s) Oral two times a day  oxybutynin 5 milliGRAM(s) Oral daily  pantoprazole   Suspension 40 milliGRAM(s) Oral daily  predniSONE   Tablet 5 milliGRAM(s) Oral daily  tiotropium 18 MICROgram(s) Capsule 1 Capsule(s) Inhalation daily    MEDICATIONS  (PRN):  acetaminophen  Suppository .. 650 milliGRAM(s) Rectal every 6 hours PRN Temp greater or equal to 38C (100.4F), Mild Pain (1 - 3)  ondansetron Injectable 4 milliGRAM(s) IV Push every 4 hours PRN Nausea and/or Vomiting

## 2022-02-17 ENCOUNTER — TRANSCRIBE ORDER (OUTPATIENT)
Dept: SCHEDULING | Age: 77
End: 2022-02-17

## 2022-02-17 DIAGNOSIS — I63.9 STROKE (HCC): Primary | ICD-10-CM

## 2022-02-17 DIAGNOSIS — R41.89 COGNITIVE CHANGE: ICD-10-CM

## 2022-02-17 DIAGNOSIS — R26.9 GAIT ABNORMALITY: ICD-10-CM

## 2022-02-17 DIAGNOSIS — R26.9 GAIT ABNORMALITY: Primary | ICD-10-CM

## 2022-02-17 DIAGNOSIS — R27.8 INCOORDINATION OF EXTREMITY: ICD-10-CM

## 2022-03-19 PROBLEM — Z94.0 RENAL TRANSPLANT RECIPIENT: Status: ACTIVE | Noted: 2017-09-26

## 2022-03-19 PROBLEM — I25.10 CORONARY ARTERY DISEASE INVOLVING NATIVE CORONARY ARTERY OF NATIVE HEART WITHOUT ANGINA PECTORIS: Status: ACTIVE | Noted: 2017-10-31

## 2022-05-12 ENCOUNTER — OFFICE VISIT (OUTPATIENT)
Dept: CARDIOLOGY CLINIC | Age: 77
End: 2022-05-12
Payer: MEDICARE

## 2022-05-12 VITALS
WEIGHT: 189 LBS | HEART RATE: 61 BPM | OXYGEN SATURATION: 100 % | HEIGHT: 67 IN | DIASTOLIC BLOOD PRESSURE: 71 MMHG | BODY MASS INDEX: 29.66 KG/M2 | SYSTOLIC BLOOD PRESSURE: 131 MMHG

## 2022-05-12 DIAGNOSIS — N18.6 ESRD (END STAGE RENAL DISEASE) (HCC): ICD-10-CM

## 2022-05-12 DIAGNOSIS — E78.00 HYPERCHOLESTEROLEMIA: ICD-10-CM

## 2022-05-12 DIAGNOSIS — I25.10 CORONARY ARTERY DISEASE INVOLVING NATIVE CORONARY ARTERY OF NATIVE HEART WITHOUT ANGINA PECTORIS: Primary | ICD-10-CM

## 2022-05-12 DIAGNOSIS — I10 HTN (HYPERTENSION): ICD-10-CM

## 2022-05-12 DIAGNOSIS — I11.9 HYPERTENSIVE LEFT VENTRICULAR HYPERTROPHY, WITHOUT HEART FAILURE: ICD-10-CM

## 2022-05-12 PROCEDURE — G9231 DOC ESRD DIA TRANS PREG: HCPCS | Performed by: INTERNAL MEDICINE

## 2022-05-12 PROCEDURE — G8536 NO DOC ELDER MAL SCRN: HCPCS | Performed by: INTERNAL MEDICINE

## 2022-05-12 PROCEDURE — G8427 DOCREV CUR MEDS BY ELIG CLIN: HCPCS | Performed by: INTERNAL MEDICINE

## 2022-05-12 PROCEDURE — 99214 OFFICE O/P EST MOD 30 MIN: CPT | Performed by: INTERNAL MEDICINE

## 2022-05-12 PROCEDURE — G8419 CALC BMI OUT NRM PARAM NOF/U: HCPCS | Performed by: INTERNAL MEDICINE

## 2022-05-12 PROCEDURE — 1101F PT FALLS ASSESS-DOCD LE1/YR: CPT | Performed by: INTERNAL MEDICINE

## 2022-05-12 PROCEDURE — G8432 DEP SCR NOT DOC, RNG: HCPCS | Performed by: INTERNAL MEDICINE

## 2022-05-12 PROCEDURE — 3017F COLORECTAL CA SCREEN DOC REV: CPT | Performed by: INTERNAL MEDICINE

## 2022-05-12 RX ORDER — ATORVASTATIN CALCIUM 80 MG/1
80 TABLET, FILM COATED ORAL DAILY
Qty: 90 TABLET | Refills: 3 | Status: SHIPPED | OUTPATIENT
Start: 2022-05-12 | End: 2022-08-17

## 2022-05-12 RX ORDER — PRAVASTATIN SODIUM 40 MG/1
40 TABLET ORAL
COMMUNITY
End: 2022-05-12 | Stop reason: ALTCHOICE

## 2022-05-12 RX ORDER — CARVEDILOL 25 MG/1
25 TABLET ORAL 2 TIMES DAILY
Qty: 180 TABLET | Refills: 3 | Status: SHIPPED | OUTPATIENT
Start: 2022-05-12 | End: 2022-08-17

## 2022-05-12 RX ORDER — PRAVASTATIN SODIUM 40 MG/1
40 TABLET ORAL
Qty: 90 TABLET | Refills: 3 | Status: CANCELLED | OUTPATIENT
Start: 2022-05-12

## 2022-05-12 RX ORDER — LIDOCAINE AND PRILOCAINE 25; 25 MG/G; MG/G
CREAM TOPICAL AS NEEDED
COMMUNITY

## 2022-05-12 NOTE — PROGRESS NOTES
HISTORY OF PRESENT ILLNESS  General 1981 [place] MELONY is a 78 y.o. male. 5/2021  Patient seen today for follow-up. Follow-up  Pertinent negatives include no chest pain, no abdominal pain, no headaches and no shortness of breath. Hypertension  The history is provided by the patient. This is a chronic problem. The problem occurs constantly. The problem has not changed since onset. Pertinent negatives include no chest pain, no abdominal pain, no headaches and no shortness of breath. Nothing aggravates the symptoms. Cholesterol Problem  The history is provided by the patient. This is a chronic problem. The problem occurs constantly. Pertinent negatives include no chest pain, no abdominal pain, no headaches and no shortness of breath. Shortness of Breath  The history is provided by the patient. This is a recurrent problem. The problem occurs frequently. The problem has been gradually worsening. Pertinent negatives include no fever, no headaches, no cough, no sputum production, no hemoptysis, no wheezing, no PND, no orthopnea, no chest pain, no vomiting, no abdominal pain, no rash, no leg swelling and no claudication. Precipitated by: exertion,dialysis. Dizziness  The history is provided by the patient. This is a recurrent problem. The problem occurs rarely. Pertinent negatives include no chest pain, no abdominal pain, no headaches and no shortness of breath. Review of Systems   Constitutional: Negative for chills and fever. HENT: Negative for nosebleeds. Eyes: Negative for blurred vision and double vision. Respiratory: Negative for cough, hemoptysis, sputum production, shortness of breath and wheezing. Cardiovascular: Negative for chest pain, palpitations, orthopnea, claudication, leg swelling and PND. Gastrointestinal: Negative for abdominal pain, heartburn, nausea and vomiting. Musculoskeletal: Negative for myalgias. Skin: Negative for rash. Neurological: Positive for dizziness. Negative for weakness and headaches. Endo/Heme/Allergies: Does not bruise/bleed easily. Family History   Problem Relation Age of Onset    Hypertension Sister     Hypertension Brother     Hypertension Father        Past Medical History:   Diagnosis Date    Anemia in chronic kidney disease     Chronic kidney disease     History of kidney transplant x3    Chronic kidney disease     HD- M-W-F    History of echocardiogram 2015    EF 55-60%. No WMA. Mod conc LVH. RVSP 40  mmHg. Mod LAE.  JCARLOS. Mild MR. Mild PI. Mild AoRE. Mild ascend'g Ao dilatation.  HTN (hypertension)     Hypercholesterolemia     Sleep apnea     CPAP       Past Surgical History:   Procedure Laterality Date    COLONOSCOPY N/A 2018    COLONOSCOPY performed by Aarti Murillo MD at [facility] ENDOSCOPY    HX PARTIAL THYROIDECTOMY      HX RENAL TRANSPLANT      X3    HX SPLENECTOMY      VASCULAR SURGERY PROCEDURE UNLIST      LEFT ARM FISTULA       Social History     Tobacco Use    Smoking status: Former Smoker     Packs/day: 0.50     Years: 10.00     Pack years: 5.00     Quit date: 1975     Years since quittin.3    Smokeless tobacco: Never Used   Substance Use Topics    Alcohol use: No       No Known Allergies    Prior to Admission medications    Medication Sig Start Date End Date Taking? Authorizing Provider   lidocaine-prilocaine (EMLA) topical cream Apply  to affected area as needed for Pain. Yes Provider, Historical   losartan (COZAAR) 50 mg tablet Take  by mouth daily. Yes Provider, Historical   tamsulosin (FLOMAX) 0.4 mg capsule Take 0.4 mg by mouth daily. Yes Provider, Historical   aspirin delayed-release 81 mg tablet Take 81 mg by mouth daily. Yes Provider, Historical   Cetirizine (ZYRTEC) 10 mg cap Take  by mouth daily. Yes Provider, Historical   CHOLECALCIFEROL, VITAMIN D3, (VITAMIN D3 PO) Take  by mouth. Yes Provider, Historical   multivitamin (ONE A DAY) tablet Take 1 tablet by mouth daily. Yes Provider, Historical   atorvastatin (LIPITOR) 80 mg tablet Take 1 Tablet by mouth daily. 5/12/22  Yes Dariel Bravo MD   carvediloL (Coreg) 25 mg tablet Take 1 Tablet by mouth two (2) times a day. 5/12/22  Yes Dariel Bravo MD         Visit Vitals  /71   Pulse 61   Ht 5' 7\" (1.702 m)   Wt 85.7 kg (189 lb)   SpO2 100%   BMI 29.60 kg/m²         Physical Exam  Constitutional:       Appearance: He is well-developed. HENT:      Head: Normocephalic and atraumatic. Eyes:      Conjunctiva/sclera: Conjunctivae normal.   Neck:      Thyroid: No thyromegaly. Vascular: No JVD. Trachea: No tracheal deviation. Cardiovascular:      Rate and Rhythm: Normal rate and regular rhythm. Heart sounds: Murmur heard. Holosystolic murmur is present at the lower left sternal border. No friction rub. No gallop. Pulmonary:      Effort: No respiratory distress. Breath sounds: No wheezing or rales. Chest:      Chest wall: No tenderness. Abdominal:      Palpations: Abdomen is soft. Tenderness: There is no abdominal tenderness. Musculoskeletal:      Cervical back: Neck supple. Skin:     General: Skin is warm and dry. Neurological:      Mental Status: He is alert and oriented to person, place, and time. Mr. Trell Duffy III has a reminder for a \"due or due soon\" health maintenance. I have asked that he contact his primary care provider for follow-up on this health maintenance. [id number]  Left ventricle: Systolic function was normal by EF (biplane method of  disks). Ejection fraction was estimated in the range of 55 % to 60 %. No  obvious wall motion abnormalities identified in the views obtained. Wall  thickness was moderately to markedly increased. There was moderate  concentric hypertrophy. Right ventricle: Systolic pressure was mildly to moderately increased. Estimated peak pressure was in the range of 40 mmHg.     Left atrium: The atrium was moderately dilated. Right atrium: The atrium was dilated. Mitral valve: There was mild regurgitation. Aortic valve: The valve was trileaflet. Pulmonic valve: There was mild regurgitation. Aorta, systemic arteries: The root exhibited mild dilatation. There was  mild dilatation of the ascending aorta.  ekg-2/2017  Sinus  Rhythm   WITHIN NORMAL LIMITS    SUMMARY:3/2017-echo  Left ventricle: Systolic function was normal. Ejection fraction was  estimated in the range of 60 % to 65 %. There were no regional wall motion  abnormalities. There was moderate concentric hypertrophy. Features were  consistent with a pseudonormal left ventricular filling pattern, with  concomitant abnormal relaxation and increased filling pressure (grade 2  diastolic dysfunction). Right ventricle: The ventricle was dilated. Left atrium: The atrium was severely dilated. LA volume index was 73.44  ml/mï¾². Right atrium: The atrium was dilated. Mitral valve: There was mild annular calcification. There was mild  regurgitation. Tricuspid valve: There was mild regurgitation. Pulmonic valve: There was mild regurgitation. Aorta, systemic arteries: The root exhibited upper limit of normal size. Cath-9/2017-cath  HEMODYNAMICS: LVEDP is 16. PCW mean of 7. PA 35/10/16. RV  35/6 (EDP). RA mean is 3. Aortic pressure 160/70. No gradient at  aortic valve at the time of pullback. Cardiac output by Miguel method is 7.8 L/minute and by thermodilution is  5.6 L/minute. Cardiac index by Miguel is 3.99 and by thermodilution is 2.9. CONCLUSION:9/2017  1. Normal pulmonary artery pressure. 2. High normal left ventricular end-diastolic pressure without any aortic  valve gradient. 3. One-vessel coronary artery disease with 70% proximal and ostial  stenosis of second diagonal artery without any flow limitation  angiographically. 4. Normal left ventricular wall motion and ejection fraction in the range  of 60% to 65%.   Interpretation Summary 7/2018  · Calculated left ventricular ejection fraction is 65%. Left ventricular mild concentric hypertrophy observed. Normal left ventricular wall motion, no regional wall motion abnormality noted. Mild (grade 1) left ventricular diastolic dysfunction. · Left atrial cavity size is moderately dilated. · Right ventricular cavity size is mildly dilated  · Right atrial cavity size is mildly dilated. · Mild aortic valve sclerosis. · Mild mitral valve regurgitation. · Mild tricuspid valve regurgitation is present. Pulmonary arterial systolic pressure is 35 mmHg. There is no evidence of pulmonary hypertension. · Mild aortic root and ascending aorta dilatation.           Assessment         ICD-10-CM ICD-9-CM    1. Coronary artery disease involving native coronary artery of native heart without angina pectoris  I25.10 414.01     Stable symptom continue treatment monitor   2. HTN (hypertension)  I10 401.9     Controlled continue therapy   3. ESRD (end stage renal disease) ([facility] [address]  N18.6 585.6     Continue with dialysis tolerating   4. Hypertensive left ventricular hypertrophy, without heart failure  I11.9 402.90     Continue treatment blood pressure controlled   5. Hypercholesterolemia  E78.00 272.0     Currently both on atorvastatin and pravastatin. We will continue with atorvastatin     10/2017  Ok for renal transplant from [facility]  2019  Cardiac status stable. Not considered renal transplant candidate due to age  8/2019  Cardiac status stable. Continue current treatment. 2/2020  Cardiac status stable. Monitor blood pressure during dialysis  5/2021  Stable cardiac status. Mildly elevated blood pressure continue monitoring. Continue aspirin and statin. Lab with PCP    11/2021  Cardiac status stable. Blood pressure mildly elevated continue to monitor dialysis patient which she is tolerating well. 5/2022  Cardiac status stable. Angina stable. ER visit with weakness data reviewed.   Currently both on atorvastatin and pravastatin I have refilled atorvastatin will discontinue pravastatin  Orders Placed This Encounter    atorvastatin (LIPITOR) 80 mg tablet     Sig: Take 1 Tablet by mouth daily. Dispense:  90 Tablet     Refill:  3    carvediloL (Coreg) 25 mg tablet     Sig: Take 1 Tablet by mouth two (2) times a day. Dispense:  180 Tablet     Refill:  3       Follow-up and Dispositions    · Return in about 6 months (around 11/12/2022).

## 2022-05-12 NOTE — PROGRESS NOTES
1. Have you been to the ER, urgent care clinic since your last visit? Hospitalized since your last visit?     no  2. Have you seen or consulted any other health care providers outside of the [address] [place] [address] since your last visit? Include any pap smears or colon screening. Yes Where: pcp     3. Since your last visit, have you had any of the following symptoms? shortness of breath.

## 2022-08-12 PROBLEM — A41.9 SEPSIS (HCC): Status: ACTIVE | Noted: 2022-08-12

## 2023-03-30 ENCOUNTER — OFFICE VISIT (OUTPATIENT)
Age: 78
End: 2023-03-30
Payer: MEDICARE

## 2023-03-30 VITALS
OXYGEN SATURATION: 99 % | BODY MASS INDEX: 27.99 KG/M2 | DIASTOLIC BLOOD PRESSURE: 66 MMHG | HEART RATE: 68 BPM | WEIGHT: 189 LBS | HEIGHT: 69 IN | SYSTOLIC BLOOD PRESSURE: 110 MMHG

## 2023-03-30 DIAGNOSIS — E78.00 PURE HYPERCHOLESTEROLEMIA, UNSPECIFIED: ICD-10-CM

## 2023-03-30 DIAGNOSIS — I11.9 HYPERTENSIVE HEART DISEASE WITHOUT HEART FAILURE: ICD-10-CM

## 2023-03-30 DIAGNOSIS — I10 ESSENTIAL (PRIMARY) HYPERTENSION: ICD-10-CM

## 2023-03-30 DIAGNOSIS — I25.10 ATHEROSCLEROSIS OF NATIVE CORONARY ARTERY OF NATIVE HEART WITHOUT ANGINA PECTORIS: Primary | ICD-10-CM

## 2023-03-30 PROCEDURE — 4004F PT TOBACCO SCREEN RCVD TLK: CPT | Performed by: INTERNAL MEDICINE

## 2023-03-30 PROCEDURE — G8417 CALC BMI ABV UP PARAM F/U: HCPCS | Performed by: INTERNAL MEDICINE

## 2023-03-30 PROCEDURE — 3074F SYST BP LT 130 MM HG: CPT | Performed by: INTERNAL MEDICINE

## 2023-03-30 PROCEDURE — 3078F DIAST BP <80 MM HG: CPT | Performed by: INTERNAL MEDICINE

## 2023-03-30 PROCEDURE — 99214 OFFICE O/P EST MOD 30 MIN: CPT | Performed by: INTERNAL MEDICINE

## 2023-03-30 PROCEDURE — 3017F COLORECTAL CA SCREEN DOC REV: CPT | Performed by: INTERNAL MEDICINE

## 2023-03-30 PROCEDURE — 1123F ACP DISCUSS/DSCN MKR DOCD: CPT | Performed by: INTERNAL MEDICINE

## 2023-03-30 PROCEDURE — G8484 FLU IMMUNIZE NO ADMIN: HCPCS | Performed by: INTERNAL MEDICINE

## 2023-03-30 PROCEDURE — G8427 DOCREV CUR MEDS BY ELIG CLIN: HCPCS | Performed by: INTERNAL MEDICINE

## 2023-03-30 RX ORDER — ATORVASTATIN CALCIUM 20 MG/1
20 TABLET, FILM COATED ORAL DAILY
Qty: 90 TABLET | Refills: 1 | Status: SHIPPED | OUTPATIENT
Start: 2023-03-30

## 2023-03-30 NOTE — PROGRESS NOTES
HISTORY OF PRESENT ILLNESS  General 1981 [place] YING is a 78 y.o. male. 5/2021  Patient seen today for follow-up. Follow-up  Pertinent negatives include no chest pain, no abdominal pain, no headaches and no shortness of breath. Hypertension  The history is provided by the patient. This is a chronic problem. The problem occurs constantly. The problem has not changed since onset. Pertinent negatives include no chest pain, no abdominal pain, no headaches and no shortness of breath. Nothing aggravates the symptoms. Cholesterol Problem  The history is provided by the patient. This is a chronic problem. The problem occurs constantly. Pertinent negatives include no chest pain, no abdominal pain, no headaches and no shortness of breath. Shortness of Breath  The history is provided by the patient. This is a recurrent problem. The problem occurs frequently. The problem has been gradually worsening. Pertinent negatives include no fever, no headaches, no cough, no sputum production, no hemoptysis, no wheezing, no PND, no orthopnea, no chest pain, no vomiting, no abdominal pain, no rash, no leg swelling and no claudication. Precipitated by: exertion,dialysis. Dizziness  The history is provided by the patient. This is a recurrent problem. The problem occurs rarely. Pertinent negatives include no chest pain, no abdominal pain, no headaches and no shortness of breath. Review of Systems   Constitutional: Negative for chills and fever. HENT: Negative for nosebleeds. Eyes: Negative for blurred vision and double vision. Respiratory: Negative for cough, hemoptysis, sputum production, shortness of breath and wheezing. Cardiovascular: Negative for chest pain, palpitations, orthopnea, claudication, leg swelling and PND. Gastrointestinal: Negative for abdominal pain, heartburn, nausea and vomiting. Musculoskeletal: Negative for myalgias. Skin: Negative for rash. Neurological: Positive for dizziness.

## 2023-11-28 ENCOUNTER — OFFICE VISIT (OUTPATIENT)
Age: 78
End: 2023-11-28
Payer: MEDICARE

## 2023-11-28 VITALS
HEIGHT: 69 IN | HEART RATE: 62 BPM | BODY MASS INDEX: 26.66 KG/M2 | DIASTOLIC BLOOD PRESSURE: 94 MMHG | WEIGHT: 180 LBS | OXYGEN SATURATION: 98 % | SYSTOLIC BLOOD PRESSURE: 160 MMHG

## 2023-11-28 DIAGNOSIS — E78.5 HYPERLIPIDEMIA, UNSPECIFIED HYPERLIPIDEMIA TYPE: ICD-10-CM

## 2023-11-28 DIAGNOSIS — I10 HYPERTENSION, UNSPECIFIED TYPE: ICD-10-CM

## 2023-11-28 DIAGNOSIS — I25.10 ATHEROSCLEROSIS OF NATIVE CORONARY ARTERY OF NATIVE HEART WITHOUT ANGINA PECTORIS: Primary | ICD-10-CM

## 2023-11-28 PROCEDURE — G8417 CALC BMI ABV UP PARAM F/U: HCPCS | Performed by: INTERNAL MEDICINE

## 2023-11-28 PROCEDURE — 1036F TOBACCO NON-USER: CPT | Performed by: INTERNAL MEDICINE

## 2023-11-28 PROCEDURE — G8428 CUR MEDS NOT DOCUMENT: HCPCS | Performed by: INTERNAL MEDICINE

## 2023-11-28 PROCEDURE — 1123F ACP DISCUSS/DSCN MKR DOCD: CPT | Performed by: INTERNAL MEDICINE

## 2023-11-28 PROCEDURE — 3077F SYST BP >= 140 MM HG: CPT | Performed by: INTERNAL MEDICINE

## 2023-11-28 PROCEDURE — 93000 ELECTROCARDIOGRAM COMPLETE: CPT | Performed by: INTERNAL MEDICINE

## 2023-11-28 PROCEDURE — G8484 FLU IMMUNIZE NO ADMIN: HCPCS | Performed by: INTERNAL MEDICINE

## 2023-11-28 PROCEDURE — 99214 OFFICE O/P EST MOD 30 MIN: CPT | Performed by: INTERNAL MEDICINE

## 2023-11-28 PROCEDURE — 3079F DIAST BP 80-89 MM HG: CPT | Performed by: INTERNAL MEDICINE

## 2023-11-28 RX ORDER — CALCIUM ACETATE 667 MG/1
1 CAPSULE ORAL
COMMUNITY

## 2023-11-28 RX ORDER — ATORVASTATIN CALCIUM 80 MG/1
80 TABLET, FILM COATED ORAL DAILY
COMMUNITY

## 2023-11-28 RX ORDER — LORATADINE 10 MG/1
10 CAPSULE, LIQUID FILLED ORAL DAILY
COMMUNITY

## 2023-11-28 RX ORDER — CARVEDILOL 25 MG/1
25 TABLET ORAL 2 TIMES DAILY WITH MEALS
COMMUNITY

## 2023-11-28 RX ORDER — DOCUSATE SODIUM 100 MG/1
100 CAPSULE, LIQUID FILLED ORAL 2 TIMES DAILY
COMMUNITY

## 2023-11-28 RX ORDER — AMLODIPINE BESYLATE 5 MG/1
5 TABLET ORAL DAILY
COMMUNITY

## 2023-11-28 RX ORDER — TAMSULOSIN HYDROCHLORIDE 0.4 MG/1
0.8 CAPSULE ORAL DAILY
COMMUNITY

## 2023-11-28 RX ORDER — AMLODIPINE BESYLATE 10 MG/1
10 TABLET ORAL DAILY
Qty: 30 TABLET | Refills: 5 | Status: SHIPPED | OUTPATIENT
Start: 2023-11-28

## 2023-11-28 ASSESSMENT — ENCOUNTER SYMPTOMS
CHEST TIGHTNESS: 0
NAUSEA: 0
SHORTNESS OF BREATH: 0
COUGH: 0
COLOR CHANGE: 0
BLOOD IN STOOL: 0
CONSTIPATION: 0
DIARRHEA: 0
APNEA: 0
ABDOMINAL PAIN: 0
WHEEZING: 0

## 2023-11-28 NOTE — PROGRESS NOTES
Have you had Fatigue? No   2. Have you had have you had Chest Pain? No   3. Have you had Dyspnea (SOB) ? Yes if so how long: on and off for about a year  can you walk 2 blocks or a flight of stairs without SOB yes    4. Have you had Orthopnea? No   5. Have you had PND? No   6. Have you had leg swelling? No  7. Have you had any weight gain? No   8. Have you had any palpitations? No    9. Have you had any syncope? No   10. Do you have any wounds on legs?  no
will increase norvasc to 10mg po daily, continue coreg 25mg po bid. [facility] titration prior to next visit. HLD - Lipitor 80mg po hs survey lipid panel periodically. LILY - Uses a face mask at night and tolerates okay. Follow-up and Dispositions    Return in about 1 year (around 11/28/2024) for Follow up Test Results, Assess Volume Status, Periodic Assessment.

## 2023-11-28 NOTE — PATIENT INSTRUCTIONS
Learning About the [address] [place] [address] Diet  What is the Mediterranean diet? The Mediterranean diet is a style of eating rather than a diet plan. It features foods eaten in [facility], [place], [place] and [place], and other countries along the [place]. It emphasizes eating foods like fish, fruits, vegetables, beans, high-fiber breads and whole grains, nuts, and olive oil. This style of eating includes limited red meat, cheese, and sweets. Why choose the Mediterranean diet? A Mediterranean-style diet may improve heart health. It contains more fat than other heart-healthy diets. But the fats are mainly from nuts, unsaturated oils (such as fish oils and olive oil), and certain nut or seed oils (such as canola, soybean, or flaxseed oil). These fats may help protect the heart and blood vessels. How can you get started on the Mediterranean diet? Here are some things you can do to switch to a more Mediterranean way of eating. What to eat  Eat a variety of fruits and vegetables each day, such as grapes, blueberries, tomatoes, broccoli, peppers, figs, olives, spinach, eggplant, beans, lentils, and chickpeas. Eat a variety of whole-grain foods each day, such as oats, brown rice, and whole wheat bread, pasta, and couscous. Eat fish at least 2 times a week. Try tuna, salmon, mackerel, lake trout, herring, or sardines. Eat moderate amounts of low-fat dairy products, such as milk, cheese, or yogurt. Eat moderate amounts of poultry and eggs. Choose healthy (unsaturated) fats, such as nuts, olive oil, and certain nut or seed oils like canola, soybean, and flaxseed. Limit unhealthy (saturated) fats, such as butter, palm oil, and coconut oil. And limit fats found in animal products, such as meat and dairy products made with whole milk. Try to eat red meat only a few times a month in very small amounts. Limit sweets and desserts to only a few times a week. This includes sugar-sweetened drinks like soda.   The

## 2024-06-27 ENCOUNTER — TRANSCRIBE ORDERS (OUTPATIENT)
Facility: HOSPITAL | Age: 79
End: 2024-06-27

## 2024-06-27 ENCOUNTER — HOSPITAL ENCOUNTER (OUTPATIENT)
Facility: HOSPITAL | Age: 79
Discharge: HOME | End: 2024-06-27
Payer: MEDICARE

## 2024-06-27 DIAGNOSIS — M79.604 RIGHT LEG PAIN: ICD-10-CM

## 2024-06-27 DIAGNOSIS — M79.604 RIGHT LEG PAIN: Primary | ICD-10-CM

## 2024-06-27 PROCEDURE — 73590 X-RAY EXAM OF LOWER LEG: CPT

## (undated) DEVICE — (D)GLOVE EXAM LG NITRL NS -- DISC BY MFR NO SUB

## (undated) DEVICE — SYR 50ML SLIP TIP NSAF LF STRL --

## (undated) DEVICE — GOWN ISOL IMPERV UNIV, DISP, OPEN BACK, BLUE --

## (undated) DEVICE — FLUFF AND POLYMER UNDERPAD,EXTRA HEAVY: Brand: WINGS

## (undated) DEVICE — (D)SYR 10ML 1/5ML GRAD NSAF -- PKGING CHANGE USE ITEM 338027

## (undated) DEVICE — MEDI-VAC NON-CONDUCTIVE SUCTION TUBING: Brand: CARDINAL HEALTH

## (undated) DEVICE — AIRLIFE™ NASAL OXYGEN CANNULA CURVED, NONFLARED TIP WITH 14 FOOT (4.3 M) CRUSH-RESISTANT TUBING, OVER-THE-EAR STYLE: Brand: AIRLIFE™

## (undated) DEVICE — CATHETER SUCT TR FL TIP 14FR W/ O CTRL

## (undated) DEVICE — FORCEPS BX L240CM JAW DIA2.8MM L CAP W/ NDL MIC MESH TOOTH

## (undated) DEVICE — MEDI-VAC SUCTION HIGH CAPACITY: Brand: CARDINAL HEALTH

## (undated) DEVICE — FLEX ADVANTAGE 3000CC: Brand: FLEX ADVANTAGE

## (undated) DEVICE — CANNULA ORIG TL CLR W FOAM CUSHIONS AND 14FT SUPL TB 3 CHN

## (undated) DEVICE — ENDOSCOPY PUMP TUBING/ CAP SET: Brand: ERBE

## (undated) DEVICE — SOLUTION IRRIG 1000ML H2O STRL BLT

## (undated) DEVICE — SNARE POLYP M W27MMXL240CM OVL STIFF DISP CAPTIVATOR

## (undated) DEVICE — SYRINGE MED 20ML STD CLR PLAS LUERLOCK TIP N CTRL DISP

## (undated) DEVICE — SYRINGE MED 25GA 3ML L5/8IN SUBQ PLAS W/ DETACH NDL SFTY

## (undated) DEVICE — GAUZE SPONGES,16 PLY: Brand: CURITY